# Patient Record
Sex: FEMALE | Race: WHITE | Employment: OTHER | URBAN - METROPOLITAN AREA
[De-identification: names, ages, dates, MRNs, and addresses within clinical notes are randomized per-mention and may not be internally consistent; named-entity substitution may affect disease eponyms.]

---

## 2019-05-28 ENCOUNTER — HOSPITAL ENCOUNTER (INPATIENT)
Age: 79
LOS: 2 days | Discharge: HOME | DRG: 378 | End: 2019-05-30
Attending: INTERNAL MEDICINE | Admitting: INTERNAL MEDICINE
Payer: MEDICARE

## 2019-05-28 PROBLEM — K92.2 GI BLEED: Status: ACTIVE | Noted: 2019-05-28

## 2019-05-28 LAB
ABO/RH: NORMAL
ANTIBODY SCREEN: NORMAL
HEMOGLOBIN: 8 G/DL (ref 12–16)

## 2019-05-28 PROCEDURE — 6370000000 HC RX 637 (ALT 250 FOR IP): Performed by: INTERNAL MEDICINE

## 2019-05-28 PROCEDURE — 2500000003 HC RX 250 WO HCPCS: Performed by: PHYSICIAN ASSISTANT

## 2019-05-28 PROCEDURE — 2580000003 HC RX 258: Performed by: PHYSICIAN ASSISTANT

## 2019-05-28 PROCEDURE — 86901 BLOOD TYPING SEROLOGIC RH(D): CPT

## 2019-05-28 PROCEDURE — 1200000000 HC SEMI PRIVATE

## 2019-05-28 PROCEDURE — 85018 HEMOGLOBIN: CPT

## 2019-05-28 PROCEDURE — 86850 RBC ANTIBODY SCREEN: CPT

## 2019-05-28 PROCEDURE — 86900 BLOOD TYPING SEROLOGIC ABO: CPT

## 2019-05-28 PROCEDURE — 36415 COLL VENOUS BLD VENIPUNCTURE: CPT

## 2019-05-28 RX ORDER — DIPHENHYDRAMINE HCL 25 MG
25 TABLET ORAL EVERY 4 HOURS PRN
COMMUNITY

## 2019-05-28 RX ORDER — M-VIT,TX,IRON,MINS/CALC/FOLIC 27MG-0.4MG
1 TABLET ORAL DAILY
COMMUNITY

## 2019-05-28 RX ORDER — CARVEDILOL 6.25 MG/1
6.25 TABLET ORAL 2 TIMES DAILY
COMMUNITY

## 2019-05-28 RX ORDER — ACETAMINOPHEN 325 MG/1
650 TABLET ORAL EVERY 4 HOURS PRN
Status: DISCONTINUED | OUTPATIENT
Start: 2019-05-28 | End: 2019-05-30 | Stop reason: HOSPADM

## 2019-05-28 RX ORDER — UBIDECARENONE 75 MG
200 CAPSULE ORAL DAILY
COMMUNITY

## 2019-05-28 RX ORDER — LEVOTHYROXINE SODIUM 0.03 MG/1
25 TABLET ORAL DAILY
Status: DISCONTINUED | OUTPATIENT
Start: 2019-05-29 | End: 2019-05-30 | Stop reason: HOSPADM

## 2019-05-28 RX ORDER — LIDOCAINE 40 MG/G
CREAM TOPICAL PRN
Status: DISCONTINUED | OUTPATIENT
Start: 2019-05-28 | End: 2019-05-30 | Stop reason: HOSPADM

## 2019-05-28 RX ORDER — SODIUM CHLORIDE 0.9 % (FLUSH) 0.9 %
10 SYRINGE (ML) INJECTION EVERY 12 HOURS SCHEDULED
Status: DISCONTINUED | OUTPATIENT
Start: 2019-05-28 | End: 2019-05-30 | Stop reason: HOSPADM

## 2019-05-28 RX ORDER — SODIUM CHLORIDE 0.9 % (FLUSH) 0.9 %
10 SYRINGE (ML) INJECTION PRN
Status: DISCONTINUED | OUTPATIENT
Start: 2019-05-28 | End: 2019-05-30 | Stop reason: HOSPADM

## 2019-05-28 RX ORDER — DOCUSATE SODIUM 100 MG/1
100 CAPSULE, LIQUID FILLED ORAL 2 TIMES DAILY PRN
COMMUNITY

## 2019-05-28 RX ORDER — ACETAMINOPHEN 325 MG/1
650 TABLET ORAL EVERY 4 HOURS PRN
COMMUNITY

## 2019-05-28 RX ORDER — SODIUM CHLORIDE 9 MG/ML
INJECTION, SOLUTION INTRAVENOUS CONTINUOUS
Status: DISCONTINUED | OUTPATIENT
Start: 2019-05-28 | End: 2019-05-30 | Stop reason: HOSPADM

## 2019-05-28 RX ORDER — IPRATROPIUM BROMIDE AND ALBUTEROL SULFATE 2.5; .5 MG/3ML; MG/3ML
1 SOLUTION RESPIRATORY (INHALATION) EVERY 4 HOURS PRN
COMMUNITY

## 2019-05-28 RX ORDER — IPRATROPIUM BROMIDE AND ALBUTEROL SULFATE 2.5; .5 MG/3ML; MG/3ML
1 SOLUTION RESPIRATORY (INHALATION) EVERY 4 HOURS PRN
Status: DISCONTINUED | OUTPATIENT
Start: 2019-05-28 | End: 2019-05-28

## 2019-05-28 RX ORDER — POTASSIUM CHLORIDE 750 MG/1
10 TABLET, EXTENDED RELEASE ORAL 2 TIMES DAILY
Status: DISCONTINUED | OUTPATIENT
Start: 2019-05-28 | End: 2019-05-30 | Stop reason: HOSPADM

## 2019-05-28 RX ORDER — LEVOTHYROXINE SODIUM 0.03 MG/1
25 TABLET ORAL DAILY
Status: DISCONTINUED | OUTPATIENT
Start: 2019-05-28 | End: 2019-05-28

## 2019-05-28 RX ORDER — SODIUM CHLORIDE 9 MG/ML
INJECTION, SOLUTION INTRAVENOUS CONTINUOUS
Status: DISCONTINUED | OUTPATIENT
Start: 2019-05-28 | End: 2019-05-29

## 2019-05-28 RX ORDER — IPRATROPIUM BROMIDE AND ALBUTEROL SULFATE 2.5; .5 MG/3ML; MG/3ML
1 SOLUTION RESPIRATORY (INHALATION) EVERY 4 HOURS PRN
Status: DISCONTINUED | OUTPATIENT
Start: 2019-05-28 | End: 2019-05-30 | Stop reason: HOSPADM

## 2019-05-28 RX ORDER — LEVOTHYROXINE SODIUM 0.03 MG/1
25 TABLET ORAL DAILY
COMMUNITY

## 2019-05-28 RX ORDER — POTASSIUM CHLORIDE 750 MG/1
10 CAPSULE, EXTENDED RELEASE ORAL DAILY
COMMUNITY

## 2019-05-28 RX ORDER — LIDOCAINE 50 MG/G
OINTMENT TOPICAL PRN
COMMUNITY

## 2019-05-28 RX ORDER — ONDANSETRON 2 MG/ML
4 INJECTION INTRAMUSCULAR; INTRAVENOUS EVERY 6 HOURS PRN
Status: DISCONTINUED | OUTPATIENT
Start: 2019-05-28 | End: 2019-05-30 | Stop reason: HOSPADM

## 2019-05-28 RX ORDER — NITROGLYCERIN 0.4 MG/1
0.4 TABLET SUBLINGUAL EVERY 5 MIN PRN
COMMUNITY

## 2019-05-28 RX ADMIN — POTASSIUM CHLORIDE 10 MEQ: 10 TABLET, EXTENDED RELEASE ORAL at 22:37

## 2019-05-28 RX ADMIN — SODIUM CHLORIDE: 9 INJECTION, SOLUTION INTRAVENOUS at 15:45

## 2019-05-28 RX ADMIN — Medication 10 ML: at 22:38

## 2019-05-28 RX ADMIN — SODIUM CHLORIDE 8 MG/HR: 9 INJECTION, SOLUTION INTRAVENOUS at 15:46

## 2019-05-28 ASSESSMENT — PAIN SCALES - GENERAL: PAINLEVEL_OUTOF10: 0

## 2019-05-28 NOTE — PROGRESS NOTES
4 Eyes Skin Assessment     The patient is being assess for   Admission    I agree that 2 RN's have performed a thorough Head to Toe Skin Assessment on the patient. ALL assessment sites listed below have been assessed. Areas assessed by both nurses:   [x]   Head, Face, and Ears   [x]   Shoulders, Back, and Chest, Abdomen  [x]   Arms, Elbows, and Hands   [x]   Coccyx, Sacrum, and Ischium  [x]   Legs, Feet, and Heels        Skin intact    **SHARE this note so that the co-signing nurse is able to place an eSignature**    Co-signer eSignature: {Esignature:[id number]}    Does the Patient have Skin Breakdown?   No          Gutierrez Prevention initiated:  No   Wound Care Orders initiated:  No      [facility] nurse consulted for Pressure Injury (Stage 3,4, Unstageable, DTI, NWPT, Complex wounds)and New or Established Ostomies:  No      Primary Nurse eSignature: Electronically signed by Nahun Miller RN on 5/28/19 at 4:09 PM

## 2019-05-28 NOTE — PROGRESS NOTES
Multiple changes made to home medication list. Dr. Roz Sandoval notified. Telephone order with read back to resume synthroid, lidocaine cream, potassium chloride, and duonebs as how she normally takes. To hold BP medications and lasix and BP borderline. MD also notified of hx of CHF. Fluids currently going at 150 mL/hr. Patient does c/o shortness of breath at rest, but respirations unlabored. Crackles noted to bases of lungs. To stop fluids for right now per Dr. Roz Sandoval.

## 2019-05-29 ENCOUNTER — ANESTHESIA EVENT (OUTPATIENT)
Dept: ENDOSCOPY | Age: 79
DRG: 378 | End: 2019-05-29
Payer: MEDICARE

## 2019-05-29 ENCOUNTER — ANESTHESIA (OUTPATIENT)
Dept: ENDOSCOPY | Age: 79
DRG: 378 | End: 2019-05-29
Payer: MEDICARE

## 2019-05-29 VITALS
DIASTOLIC BLOOD PRESSURE: 53 MMHG | RESPIRATION RATE: 22 BRPM | OXYGEN SATURATION: 100 % | SYSTOLIC BLOOD PRESSURE: 138 MMHG

## 2019-05-29 PROBLEM — F03.90 DEMENTIA (HCC): Status: ACTIVE | Noted: 2019-05-29

## 2019-05-29 PROBLEM — D62 ACUTE BLOOD LOSS ANEMIA: Status: ACTIVE | Noted: 2019-05-29

## 2019-05-29 PROBLEM — E03.9 ACQUIRED HYPOTHYROIDISM: Status: ACTIVE | Noted: 2019-05-29

## 2019-05-29 LAB
ANION GAP SERPL CALCULATED.3IONS-SCNC: 8 MMOL/L (ref 3–16)
BASOPHILS ABSOLUTE: 0.1 K/UL (ref 0–0.2)
BASOPHILS RELATIVE PERCENT: 0.6 %
BLOOD BANK DISPENSE STATUS: NORMAL
BLOOD BANK PRODUCT CODE: NORMAL
BPU ID: NORMAL
BUN BLDV-MCNC: 32 MG/DL (ref 7–20)
CALCIUM SERPL-MCNC: 8.7 MG/DL (ref 8.3–10.6)
CHLORIDE BLD-SCNC: 107 MMOL/L (ref 99–110)
CO2: 31 MMOL/L (ref 21–32)
CREAT SERPL-MCNC: 1.2 MG/DL (ref 0.6–1.2)
DESCRIPTION BLOOD BANK: NORMAL
EOSINOPHILS ABSOLUTE: 0.2 K/UL (ref 0–0.6)
EOSINOPHILS RELATIVE PERCENT: 2.1 %
GFR AFRICAN AMERICAN: 52
GFR NON-AFRICAN AMERICAN: 43
GLUCOSE BLD-MCNC: 120 MG/DL (ref 70–99)
HCT VFR BLD CALC: 22.4 % (ref 36–48)
HEMOGLOBIN: 6.8 G/DL (ref 12–16)
HEMOGLOBIN: 7.1 G/DL (ref 12–16)
HEMOGLOBIN: 7.3 G/DL (ref 12–16)
HEMOGLOBIN: 7.5 G/DL (ref 12–16)
LYMPHOCYTES ABSOLUTE: 1.6 K/UL (ref 1–5.1)
LYMPHOCYTES RELATIVE PERCENT: 18.6 %
MCH RBC QN AUTO: 31.2 PG (ref 26–34)
MCHC RBC AUTO-ENTMCNC: 32.5 G/DL (ref 31–36)
MCV RBC AUTO: 95.9 FL (ref 80–100)
MONOCYTES ABSOLUTE: 0.9 K/UL (ref 0–1.3)
MONOCYTES RELATIVE PERCENT: 10.6 %
NEUTROPHILS ABSOLUTE: 5.9 K/UL (ref 1.7–7.7)
NEUTROPHILS RELATIVE PERCENT: 68.1 %
PDW BLD-RTO: 14.8 % (ref 12.4–15.4)
PLATELET # BLD: 176 K/UL (ref 135–450)
PMV BLD AUTO: 8.2 FL (ref 5–10.5)
POTASSIUM REFLEX MAGNESIUM: 4.6 MMOL/L (ref 3.5–5.1)
RBC # BLD: 2.34 M/UL (ref 4–5.2)
SODIUM BLD-SCNC: 146 MMOL/L (ref 136–145)
WBC # BLD: 8.7 K/UL (ref 4–11)

## 2019-05-29 PROCEDURE — 88305 TISSUE EXAM BY PATHOLOGIST: CPT

## 2019-05-29 PROCEDURE — 80048 BASIC METABOLIC PNL TOTAL CA: CPT

## 2019-05-29 PROCEDURE — 7100000010 HC PHASE II RECOVERY - FIRST 15 MIN: Performed by: INTERNAL MEDICINE

## 2019-05-29 PROCEDURE — 6360000002 HC RX W HCPCS: Performed by: PHYSICIAN ASSISTANT

## 2019-05-29 PROCEDURE — P9016 RBC LEUKOCYTES REDUCED: HCPCS

## 2019-05-29 PROCEDURE — 3700000000 HC ANESTHESIA ATTENDED CARE: Performed by: INTERNAL MEDICINE

## 2019-05-29 PROCEDURE — 6360000002 HC RX W HCPCS: Performed by: NURSE ANESTHETIST, CERTIFIED REGISTERED

## 2019-05-29 PROCEDURE — 85025 COMPLETE CBC W/AUTO DIFF WBC: CPT

## 2019-05-29 PROCEDURE — 86923 COMPATIBILITY TEST ELECTRIC: CPT

## 2019-05-29 PROCEDURE — 7100000011 HC PHASE II RECOVERY - ADDTL 15 MIN: Performed by: INTERNAL MEDICINE

## 2019-05-29 PROCEDURE — 94150 VITAL CAPACITY TEST: CPT

## 2019-05-29 PROCEDURE — 85018 HEMOGLOBIN: CPT

## 2019-05-29 PROCEDURE — 6370000000 HC RX 637 (ALT 250 FOR IP): Performed by: INTERNAL MEDICINE

## 2019-05-29 PROCEDURE — 36430 TRANSFUSION BLD/BLD COMPNT: CPT

## 2019-05-29 PROCEDURE — 1200000000 HC SEMI PRIVATE

## 2019-05-29 PROCEDURE — 3609012400 HC EGD TRANSORAL BIOPSY SINGLE/MULTIPLE: Performed by: INTERNAL MEDICINE

## 2019-05-29 PROCEDURE — 0DB68ZX EXCISION OF STOMACH, VIA NATURAL OR ARTIFICIAL OPENING ENDOSCOPIC, DIAGNOSTIC: ICD-10-PCS | Performed by: INTERNAL MEDICINE

## 2019-05-29 PROCEDURE — 88342 IMHCHEM/IMCYTCHM 1ST ANTB: CPT

## 2019-05-29 PROCEDURE — 36415 COLL VENOUS BLD VENIPUNCTURE: CPT

## 2019-05-29 PROCEDURE — 94761 N-INVAS EAR/PLS OXIMETRY MLT: CPT

## 2019-05-29 PROCEDURE — 2500000003 HC RX 250 WO HCPCS: Performed by: PHYSICIAN ASSISTANT

## 2019-05-29 PROCEDURE — 2580000003 HC RX 258: Performed by: PHYSICIAN ASSISTANT

## 2019-05-29 PROCEDURE — 2580000003 HC RX 258: Performed by: INTERNAL MEDICINE

## 2019-05-29 PROCEDURE — 2700000000 HC OXYGEN THERAPY PER DAY

## 2019-05-29 PROCEDURE — 99222 1ST HOSP IP/OBS MODERATE 55: CPT | Performed by: PHYSICIAN ASSISTANT

## 2019-05-29 PROCEDURE — 2709999900 HC NON-CHARGEABLE SUPPLY: Performed by: INTERNAL MEDICINE

## 2019-05-29 RX ORDER — HYDROMORPHONE HCL 110MG/55ML
0.5 PATIENT CONTROLLED ANALGESIA SYRINGE INTRAVENOUS EVERY 5 MIN PRN
Status: DISCONTINUED | OUTPATIENT
Start: 2019-05-29 | End: 2019-05-30 | Stop reason: HOSPADM

## 2019-05-29 RX ORDER — PROMETHAZINE HYDROCHLORIDE 25 MG/ML
6.25 INJECTION, SOLUTION INTRAMUSCULAR; INTRAVENOUS
Status: DISCONTINUED | OUTPATIENT
Start: 2019-05-29 | End: 2019-05-30 | Stop reason: HOSPADM

## 2019-05-29 RX ORDER — OXYCODONE HYDROCHLORIDE AND ACETAMINOPHEN 5; 325 MG/1; MG/1
1 TABLET ORAL PRN
Status: ACTIVE | OUTPATIENT
Start: 2019-05-29 | End: 2019-05-29

## 2019-05-29 RX ORDER — PROPOFOL 10 MG/ML
INJECTION, EMULSION INTRAVENOUS PRN
Status: DISCONTINUED | OUTPATIENT
Start: 2019-05-29 | End: 2019-05-29 | Stop reason: SDUPTHER

## 2019-05-29 RX ORDER — HYDROMORPHONE HCL 110MG/55ML
0.25 PATIENT CONTROLLED ANALGESIA SYRINGE INTRAVENOUS EVERY 5 MIN PRN
Status: DISCONTINUED | OUTPATIENT
Start: 2019-05-29 | End: 2019-05-30 | Stop reason: HOSPADM

## 2019-05-29 RX ORDER — MORPHINE SULFATE 10 MG/ML
1 INJECTION, SOLUTION INTRAMUSCULAR; INTRAVENOUS EVERY 5 MIN PRN
Status: DISCONTINUED | OUTPATIENT
Start: 2019-05-29 | End: 2019-05-30 | Stop reason: HOSPADM

## 2019-05-29 RX ORDER — ONDANSETRON 2 MG/ML
4 INJECTION INTRAMUSCULAR; INTRAVENOUS PRN
Status: DISCONTINUED | OUTPATIENT
Start: 2019-05-29 | End: 2019-05-30 | Stop reason: HOSPADM

## 2019-05-29 RX ORDER — MORPHINE SULFATE 10 MG/ML
2 INJECTION, SOLUTION INTRAMUSCULAR; INTRAVENOUS EVERY 5 MIN PRN
Status: DISCONTINUED | OUTPATIENT
Start: 2019-05-29 | End: 2019-05-30 | Stop reason: HOSPADM

## 2019-05-29 RX ORDER — 0.9 % SODIUM CHLORIDE 0.9 %
250 INTRAVENOUS SOLUTION INTRAVENOUS ONCE
Status: COMPLETED | OUTPATIENT
Start: 2019-05-29 | End: 2019-05-30

## 2019-05-29 RX ORDER — DOCUSATE SODIUM 100 MG/1
100 CAPSULE, LIQUID FILLED ORAL 2 TIMES DAILY PRN
Status: DISCONTINUED | OUTPATIENT
Start: 2019-05-29 | End: 2019-05-30 | Stop reason: HOSPADM

## 2019-05-29 RX ORDER — DIPHENHYDRAMINE HYDROCHLORIDE 50 MG/ML
12.5 INJECTION INTRAMUSCULAR; INTRAVENOUS
Status: ACTIVE | OUTPATIENT
Start: 2019-05-29 | End: 2019-05-29

## 2019-05-29 RX ORDER — HYDRALAZINE HYDROCHLORIDE 20 MG/ML
5 INJECTION INTRAMUSCULAR; INTRAVENOUS EVERY 10 MIN PRN
Status: DISCONTINUED | OUTPATIENT
Start: 2019-05-29 | End: 2019-05-30 | Stop reason: HOSPADM

## 2019-05-29 RX ORDER — FUROSEMIDE 10 MG/ML
20 INJECTION INTRAMUSCULAR; INTRAVENOUS ONCE
Status: COMPLETED | OUTPATIENT
Start: 2019-05-29 | End: 2019-05-29

## 2019-05-29 RX ORDER — LABETALOL HYDROCHLORIDE 5 MG/ML
5 INJECTION, SOLUTION INTRAVENOUS EVERY 10 MIN PRN
Status: DISCONTINUED | OUTPATIENT
Start: 2019-05-29 | End: 2019-05-30 | Stop reason: HOSPADM

## 2019-05-29 RX ORDER — OXYCODONE HYDROCHLORIDE AND ACETAMINOPHEN 5; 325 MG/1; MG/1
2 TABLET ORAL PRN
Status: ACTIVE | OUTPATIENT
Start: 2019-05-29 | End: 2019-05-29

## 2019-05-29 RX ADMIN — SODIUM CHLORIDE 250 ML: 9 INJECTION, SOLUTION INTRAVENOUS at 18:12

## 2019-05-29 RX ADMIN — SODIUM CHLORIDE: 9 INJECTION, SOLUTION INTRAVENOUS at 17:58

## 2019-05-29 RX ADMIN — SODIUM CHLORIDE 8 MG/HR: 9 INJECTION, SOLUTION INTRAVENOUS at 09:19

## 2019-05-29 RX ADMIN — PROPOFOL 60 MG: 10 INJECTION, EMULSION INTRAVENOUS at 16:06

## 2019-05-29 RX ADMIN — POTASSIUM CHLORIDE 10 MEQ: 10 TABLET, EXTENDED RELEASE ORAL at 19:51

## 2019-05-29 RX ADMIN — FUROSEMIDE 20 MG: 10 INJECTION, SOLUTION INTRAVENOUS at 22:45

## 2019-05-29 ASSESSMENT — PAIN SCALES - GENERAL
PAINLEVEL_OUTOF10: 0

## 2019-05-29 ASSESSMENT — PAIN - FUNCTIONAL ASSESSMENT: PAIN_FUNCTIONAL_ASSESSMENT: 0-10

## 2019-05-29 NOTE — FLOWSHEET NOTE
05/29/19 0825   Vital Signs   Temp 97.9 °F (36.6 °C)   Temp Source Oral   Pulse 71   Resp 16   BP 96/62   BP Location Right Arm   BP Upper/Lower Lower   Level of Consciousness 0   MEWS Score 2   Patient Currently in Pain Denies   Pain Assessment   Pain Assessment 0-10   Pain Level 0   Oxygen Therapy   SpO2 99 %   O2 Device Nasal cannula   O2 Flow Rate (L/min) 2 L/min     Shift assessment completed, see flow sheet. Morning medications held as pt is NPO for EGD today. Pt alert, but pleasantly confused. Denies any pain, shortness of breath, or nausea. Respirations are easy, even, and unlabored. Bilateral lung sounds diminished with crackles in bases. Non-pitting edema to BLE. PIV WNL with Nexium infusing @ 10 mL/hr. No needs expressed at this time. Call light and bedside table within reach. Bed alarm on. Will continue to monitor.

## 2019-05-29 NOTE — PROGRESS NOTES
Shift assessment completed. Pt is alert with hx of dementia whom is pleasantly confused and disoriented X4. Vital signs are WNL. Checked and changed and repositioned. Urine only. No s/s of bleeding. Warm blanket provided. Denies pain. No complaints voiced. Pt denies needs at this time. SR up x 2 and bed in low position. Call light is within reach. Will monitor.

## 2019-05-29 NOTE — H&P
Gastroenterology Preop Assessment    Patient:   Quentin Madden   :    1940   Facility:   [facility]  Referring/PCP: Roseanna Reyna MD  Date:     2019    Subjective:   Procedure:egd    HPI/Reason for procedure:  Melena    Past Medical History:   Diagnosis Date    [facility]-fib [facility])     [facility] [address]     CAD (coronary artery disease)     Cerebral artery occlusion with cerebral infarction ([facility] [address] [facility]     CHF (congestive heart failure) [facility] [address] [facility]     COPD (chronic obstructive pulmonary disease) ([facility] [address] [facility]     Dementia     Hyperlipidemia     Hypertension     Pacemaker     UTI (urinary tract infection)      Past Surgical History:   Procedure Laterality Date    CHOLECYSTECTOMY      HYSTERECTOMY      LAP BAND      WRIST SURGERY Right 10/29/15    ORIF right wrist with allograft bone grafting       Social:   Social History     Tobacco Use    Smoking status: Never Smoker    Smokeless tobacco: Never Used   Substance Use Topics    Alcohol use: Not Currently     Alcohol/week: 0.0 oz     Family:   Family History   Problem Relation Age of Onset    Heart Attack Father     Stroke Brother        Scheduled Medications:    sodium chloride  250 mL Intravenous Once    furosemide  20 mg Intravenous Once    sodium chloride flush  10 mL Intravenous 2 times per day    pneumococcal 13-valent conjugate  0.5 mL Intramuscular Once    potassium chloride  10 mEq Oral BID    levothyroxine  25 mcg Oral Daily       Current Medications:    Prior to Admission medications    Medication Sig Start Date End Date Taking?  Authorizing Provider   aspirin 81 MG tablet Take 81 mg by mouth daily   Yes Historical Provider, MD   levothyroxine (SYNTHROID) 25 MCG tablet Take 25 mcg by mouth Daily   Yes Historical Provider, MD   Multiple Vitamins-Minerals (THERAPEUTIC MULTIVITAMIN-MINERALS) tablet Take 1 tablet by mouth daily   Yes Historical Provider, MD   potassium chloride (MICRO-K) 10 MEQ extended release capsule Take 10 mEq by mouth daily   Yes Historical Provider, MD   vitamin B-12 (CYANOCOBALAMIN) 100 MCG tablet Take 200 mcg by mouth daily   Yes Historical Provider, MD   carvedilol (COREG) 6.25 MG tablet Take 6.25 mg by mouth 2 times daily   Yes Historical Provider, MD   diphenhydrAMINE (BENADRYL ALLERGY) 25 MG tablet Take 25 mg by mouth every 4 hours as needed for Allergies   Yes Historical Provider, MD   docusate sodium (COLACE) 100 MG capsule Take 100 mg by mouth 2 times daily as needed for Constipation   Yes Historical Provider, MD   ipratropium-albuterol (DUONEB) 0.5-2.5 (3) MG/3ML SOLN nebulizer solution Inhale 1 vial into the lungs every 4 hours as needed for Shortness of Breath   Yes Historical Provider, MD   lidocaine (XYLOCAINE) 5 % ointment Apply topically as needed for Pain Apply topically as needed. To lower back   Yes Historical Provider, MD   nitroGLYCERIN (NITROSTAT) 0.4 MG SL tablet Place 0.4 mg under the tongue every 5 minutes as needed for Chest pain up to max of 3 total doses. If no relief after 1 dose, call 911.    Yes Historical Provider, MD   acetaminophen (TYLENOL) 325 MG tablet Take 650 mg by mouth every 4 hours as needed for Pain or Fever   Yes Historical Provider, MD   raNITIdine HCl (ZANTAC PO) Take by mouth   Yes Historical Provider, MD   furosemide (LASIX) 40 MG tablet 40 mg 2 times daily  8/25/15  Yes Historical Provider, MD   isosorbide dinitrate (ISORDIL) 10 MG tablet 10 mg 2 times daily  9/16/15  Yes Historical Provider, MD   pantoprazole (PROTONIX) 40 MG tablet 40 mg daily  9/16/15  Yes Historical Provider, MD   simvastatin (ZOCOR) 40 MG tablet 40 mg nightly  9/16/15  Yes Historical Provider, MD         Current Facility-Administered Medications:     docusate sodium (COLACE) capsule 100 mg, 100 mg, Oral, BID PRN, Demario Daugherty PA-C    0.9 % sodium chloride bolus, 250 mL, Intravenous, Once, [facility]    furosemide (LASIX) injection 20 mg, 20 mg, Intravenous, Once, Demario Hawley

## 2019-05-29 NOTE — PROGRESS NOTES
Patient is laying with eyes closed. No s/s of distress at this time. Skin is cool warm blanket added. Call light and bedside table within reach. Will continue to monitor.

## 2019-05-29 NOTE — ANESTHESIA PRE PROCEDURE
Department of Anesthesiology  Preprocedure Note       Name:  Nick Simpson   Age:  [de-identified] y.o.  :  1940                                          MRN:  [id number]         Date:  2019      Surgeon: Eneida Dewey):  Venkatesh Holland DO    Procedure: EGD W/ANES. (N/A )    Medications prior to admission:   Prior to Admission medications    Medication Sig Start Date End Date Taking? Authorizing Provider   aspirin 81 MG tablet Take 81 mg by mouth daily   Yes Historical Provider, MD   levothyroxine (SYNTHROID) 25 MCG tablet Take 25 mcg by mouth Daily   Yes Historical Provider, MD   Multiple Vitamins-Minerals (THERAPEUTIC MULTIVITAMIN-MINERALS) tablet Take 1 tablet by mouth daily   Yes Historical Provider, MD   potassium chloride (MICRO-K) 10 MEQ extended release capsule Take 10 mEq by mouth daily   Yes Historical Provider, MD   vitamin B-12 (CYANOCOBALAMIN) 100 MCG tablet Take 200 mcg by mouth daily   Yes Historical Provider, MD   carvedilol (COREG) 6.25 MG tablet Take 6.25 mg by mouth 2 times daily   Yes Historical Provider, MD   diphenhydrAMINE (BENADRYL ALLERGY) 25 MG tablet Take 25 mg by mouth every 4 hours as needed for Allergies   Yes Historical Provider, MD   docusate sodium (COLACE) 100 MG capsule Take 100 mg by mouth 2 times daily as needed for Constipation   Yes Historical Provider, MD   ipratropium-albuterol (DUONEB) 0.5-2.5 (3) MG/3ML SOLN nebulizer solution Inhale 1 vial into the lungs every 4 hours as needed for Shortness of Breath   Yes Historical Provider, MD   lidocaine (XYLOCAINE) 5 % ointment Apply topically as needed for Pain Apply topically as needed. To lower back   Yes Historical Provider, MD   nitroGLYCERIN (NITROSTAT) 0.4 MG SL tablet Place 0.4 mg under the tongue every 5 minutes as needed for Chest pain up to max of 3 total doses. If no relief after 1 dose, call 911.    Yes Historical Provider, MD   acetaminophen (TYLENOL) 325 MG tablet Take 650 mg by mouth every 4 hours as needed for Pain or Fever   Yes Historical Provider, MD   raNITIdine HCl (ZANTAC PO) Take by mouth   Yes Historical Provider, MD   furosemide (LASIX) 40 MG tablet 40 mg 2 times daily  8/25/15  Yes Historical Provider, MD   isosorbide dinitrate (ISORDIL) 10 MG tablet 10 mg 2 times daily  9/16/15  Yes Historical Provider, MD   pantoprazole (PROTONIX) 40 MG tablet 40 mg daily  9/16/15  Yes Historical Provider, MD   simvastatin (ZOCOR) 40 MG tablet 40 mg nightly  9/16/15  Yes Historical Provider, MD       Current medications:    Current Facility-Administered Medications   Medication Dose Route Frequency Provider Last Rate Last Dose    sodium chloride flush 0.9 % injection 10 mL  10 mL Intravenous 2 times per day DIANNA Ruvalcaba   10 mL at 05/28/19 2238    sodium chloride flush 0.9 % injection 10 mL  10 mL Intravenous PRN DIANNA Ruvalcaba        acetaminophen (TYLENOL) tablet 650 mg  650 mg Oral Q4H PRN DIANNA Ruvalcaba        [facility]) injection 4 mg  4 mg Intravenous Q6H PRN DIANNA Ruvalcaba        esomeprazole (NEXIUM) 80 mg in sodium chloride 0.9 % 100 mL infusion  8 mg/hr Intravenous Continuous DIANNA Ruvalcaba 10 mL/hr at 05/28/19 1546 8 mg/hr at 05/28/19 1546    0.9 % sodium chloride infusion   Intravenous Continuous DIANNA Ruvalcaba   Stopped at 05/28/19 1802    pneumococcal 13-valent conjugate (PREVNAR) injection 0.5 mL  0.5 mL Intramuscular Once Balbina Feng MD        0.9 % sodium chloride infusion   Intravenous Continuous [facility] Gregorio Parker DO        potassium chloride (KLOR-CON M) extended release tablet 10 mEq  10 mEq Oral BID Balbina Feng MD   10 mEq at 05/28/19 2237    lidocaine (LMX) 4 % cream   Topical PRN Balbina Feng MD        levothyroxine (SYNTHROID) tablet 25 mcg  25 mcg Oral Daily Balbina Feng MD   Stopped at 05/29/19 0700    ipratropium-albuterol (DUONEB) nebulizer solution 1 ampule  1 ampule Inhalation Q4H PRN Balbina Feng MD Allergies: Allergies   Allergen Reactions    Demerol Hcl [Meperidine] Anxiety    Penicillins Nausea And Vomiting    Sulfur Nausea And Vomiting    Vicodin [Hydrocodone-Acetaminophen] Nausea And Vomiting       Problem List:    Patient Active Problem List   Diagnosis Code    Closed fracture of radius S52.90XA    GI bleed K92.2       Past Medical History:        Diagnosis Date    [facility] [facility]     Brain bleed [facility] [facility]     Cerebral artery occlusion with cerebral infarction ([facility] [address] [facility]     CHF (congestive heart failure) (HCC)     COPD (chronic obstructive pulmonary disease) ([facility] [address] [facility]     Hyperlipidemia     Hypertension     Pacemaker        Past Surgical History:        Procedure Laterality Date    CHOLECYSTECTOMY      HYSTERECTOMY      LAP BAND      WRIST SURGERY Right 10/29/15    ORIF right wrist with allograft bone grafting       Social History:    Social History     Tobacco Use    Smoking status: Never Smoker    Smokeless tobacco: Never Used   Substance Use Topics    Alcohol use: Not Currently     Alcohol/week: 0.0 oz                                Counseling given: Not Answered      Vital Signs (Current):   Vitals:    05/28/19 1542 05/28/19 1923 05/28/19 2030 05/29/19 0241   BP: 99/62 97/65  99/63   Pulse:  75  70   Resp:  18  18   Temp:  98 °F (36.7 °C)  97 °F (36.1 °C)   TempSrc:  Oral  Axillary   SpO2:  100%  100%   Weight:   193 lb (87.5 kg)    Height:   5' 8\" (1.727 m)                                               BP Readings from Last 3 Encounters:   05/29/19 99/63   12/22/15 114/63   11/10/15 108/61       NPO Status:                                                                                 BMI:   Wt Readings from Last 3 Encounters:   05/28/19 193 lb (87.5 kg)   12/22/15 212 lb 15.4 oz (96.6 kg)   11/10/15 212 lb 15.4 oz (96.6 kg)     Body mass index is 29.35 kg/m².     CBC:   Lab Results   Component Value Date    WBC 8.7 05/29/2019    RBC 2.34 05/29/2019    HGB 7.3 05/29/2019    HCT 22.4 05/29/2019    MCV 95.9 05/29/2019    RDW 14.8 05/29/2019     05/29/2019       CMP:   Lab Results   Component Value Date     05/29/2019    K 4.6 05/29/2019     05/29/2019    CO2 31 05/29/2019    BUN 32 05/29/2019    CREATININE 1.2 05/29/2019    GFRAA 52 05/29/2019    LABGLOM 43 05/29/2019    GLUCOSE 120 05/29/2019    CALCIUM 8.7 05/29/2019       POC Tests: No results for input(s): POCGLU, POCNA, POCK, POCCL, POCBUN, POCHEMO, POCHCT in the last 72 hours. Coags: No results found for: PROTIME, INR, APTT    HCG (If Applicable): No results found for: PREGTESTUR, PREGSERUM, HCG, HCGQUANT     ABGs: No results found for: PHART, PO2ART, GOS8ORD, TIE8IKB, BEART, T6ZSZXXX     Type & Screen (If Applicable):  No results found for: LABABO, [address]    Anesthesia Evaluation  Patient summary reviewed and Nursing notes reviewed no history of anesthetic complications:   Airway: Mallampati: III     Neck ROM: full   Dental:          Pulmonary:Negative Pulmonary ROS and normal exam    (+) COPD:                             Cardiovascular:Negative CV ROS    (+) hypertension:, pacemaker:, CAD:, dysrhythmias: atrial fibrillation, CHF:, hyperlipidemia    (-)  angina                Neuro/Psych:   Negative Neuro/Psych ROS  (+) CVA:,             GI/Hepatic/Renal: Neg GI/Hepatic/Renal ROS       (-) hiatal hernia and GERD       Endo/Other: Negative Endo/Other ROS                    Abdominal:           Vascular:                                      Anesthesia Plan      general     ASA 3     (I discussed with the patient the risks and benefits of PIV, general anesthesia, IV Narcotics, PACU. All questions were answered the patient agrees with the plan.)  Induction: intravenous. Pre-Operative Diagnosis: GI bleed [K92.2]    [de-identified] y.o.   BMI:  Body mass index is 29.35 kg/m².      Vitals:    05/29/19 0241 05/29/19 0825 05/29/19 1313 05/29/19 1455   BP: 99/63 96/62 (!) 99/58 (!) 124/49   Pulse: 70 71 71 70   Resp: 18 16 16 16   Temp: 97 °F (36.1 °C) 97.9 °F (36.6 °C) 97.1 °F (36.2 °C) 97 °F (36.1 °C)   TempSrc: Axillary Oral Oral Temporal   SpO2: 100% 99% 96% 96%   Weight:       Height:           Allergies   Allergen Reactions    Demerol Hcl [Meperidine] Anxiety    Zyvox [Linezolid]     Penicillins Nausea And Vomiting    Sulfur Nausea And Vomiting    Vicodin [Hydrocodone-Acetaminophen] Nausea And Vomiting       Social History     Tobacco Use    Smoking status: Never Smoker    Smokeless tobacco: Never Used   Substance Use Topics    Alcohol use: Not Currently     Alcohol/week: 0.0 oz       LABS:    CBC  Lab Results   Component Value Date/Time    WBC 8.7 05/29/2019 05:25 AM    HGB 7.1 (L) 05/29/2019 12:16 PM    HCT 22.4 (L) 05/29/2019 05:25 AM     05/29/2019 05:25 AM     RENAL  Lab Results   Component Value Date/Time     (H) 05/29/2019 05:25 AM    K 4.6 05/29/2019 05:25 AM     05/29/2019 05:25 AM    CO2 31 05/29/2019 05:25 AM    BUN 32 (H) 05/29/2019 05:25 AM    CREATININE 1.2 05/29/2019 05:25 AM    GLUCOSE 120 (H) 05/29/2019 05:25 AM     COAGS  No results found for: PROTIME, INR, APTT    Birdie Casillas MD   5/29/2019

## 2019-05-29 NOTE — PLAN OF CARE
Problem: Falls - Risk of:  Goal: Will remain free from falls  Description  Will remain free from falls  5/29/2019 0542 by Yogi Nguyen RN  Outcome: Ongoing  5/28/2019 2039 by Luther Fernandez RN  Outcome: Met This Shift  Note:   Pt high fall risk. Fall prevention measures in place. Bed alarm on. Call light and bedside table within reach. Bed wheels locked and bed in low position. Goal: Absence of physical injury  Description  Absence of physical injury  5/29/2019 0542 by Yogi Nguyen RN  Outcome: Ongoing  5/28/2019 2039 by Luther Fernandez RN  Outcome: Met This Shift     Problem: Infection:  Goal: Will remain free from infection  Description  Will remain free from infection  Outcome: Ongoing     Problem: Safety:  Goal: Free from accidental physical injury  Description  Free from accidental physical injury  Outcome: Ongoing  Goal: Free from intentional harm  Description  Free from intentional harm  Outcome: Ongoing     Problem: Daily Care:  Goal: Daily care needs are met  Description  Daily care needs are met  Outcome: Ongoing     Problem: Pain:  Goal: Patient's pain/discomfort is manageable  Description  Patient's pain/discomfort is manageable  Outcome: Ongoing     Problem: Skin Integrity:  Goal: Skin integrity will stabilize  Description  Skin integrity will stabilize  5/29/2019 0542 by Yogi Nguyen RN  Outcome: Ongoing  5/28/2019 2039 by Luther Fernandez RN  Outcome: Met This Shift  Note:   No s/s of skin breakdown noted. Pt turns self and encouraged to do so every 2 hours and prn. Problem: Discharge Planning:  Goal: Patients continuum of care needs are met  Description  Patients continuum of care needs are met  Outcome: Ongoing     Problem: Discharge Planning:  Goal: Discharged to appropriate level of care  Description  Discharged to appropriate level of care  Outcome: Ongoing     Problem:  Bowel Function - Altered:  Goal: Bowel elimination is within specified parameters  Description  Bowel elimination is within specified parameters  Outcome: Ongoing     Problem: Fluid Volume - Imbalance:  Goal: Will show no signs and symptoms of excessive bleeding  Description  Will show no signs and symptoms of excessive bleeding  Outcome: Ongoing  Goal: Absence of imbalanced fluid volume signs and symptoms  Description  Absence of imbalanced fluid volume signs and symptoms  Outcome: Ongoing     Problem: Nausea/Vomiting:  Goal: Absence of nausea/vomiting  Description  Absence of nausea/vomiting  Outcome: Ongoing  Goal: Able to drink  Description  Able to drink  Outcome: Ongoing  Goal: Able to eat  Description  Able to eat  Outcome: Ongoing  Goal: Ability to achieve adequate nutritional intake will improve  Description  Ability to achieve adequate nutritional intake will improve  Outcome: Ongoing

## 2019-05-29 NOTE — FLOWSHEET NOTE
05/28/19 1509   Vital Signs   Temp 97.1 °F (36.2 °C)   Temp Source Oral   Pulse 89   Resp 18   BP 99/62   Level of Consciousness 0   MEWS Score 2   Patient Currently in Pain Denies   Pain Assessment   Pain Assessment 0-10   Pain Level 0   Oxygen Therapy   SpO2 100 %   O2 Device Nasal cannula   O2 Flow Rate (L/min) 2 L/min     Pt arrived to  207-2 in stable condition. NS and protonix gtt infusing upon arrival. Patient alert, but pleasantly confused. VSS. Respirations are easy, even, and unlabored. Pt does states she feels short of breath. Bilateral lung sounds diminished with crackles in bases. Generalized trace edema. Abdomen soft, but tender. PIV WNL. Pt + family oriented to room +surroundings and updated on plan of care. Call light and bedside table within reach. Bed alarm on. Will continue to monitor.

## 2019-05-29 NOTE — DISCHARGE INSTR - COC
documented pain score (0-10 scale): Pain Level: 0  Last Weight:   Wt Readings from Last 1 Encounters:   05/28/19 193 lb (87.5 kg)     Mental Status:  disoriented and alert    IV Access:  - None    Nursing Mobility/ADLs:  Walking   Dependent  Transfer  Dependent  Bathing  Dependent  Dressing  Dependent  Toileting  Dependent  Feeding  Assisted  Med Admin  Assisted  Med Delivery   whole    Wound Care Documentation and Therapy:        Elimination:  Continence:   · Bowel: No  · Bladder: No  Urinary Catheter: None   Colostomy/Ileostomy/Ileal Conduit: No       Date of Last BM: 5-30-19  No intake or output data in the 24 hours ending 05/29/19 1026  No intake/output data recorded. Safety Concerns: At Risk for Falls    Impairments/Disabilities:      None    Nutrition Therapy:  Current Nutrition Therapy:   - Oral Diet:  General    Routes of Feeding: Oral  Liquids: Thin Liquids  Daily Fluid Restriction: no  Last Modified Barium Swallow with Video (Video Swallowing Test): not done    Treatments at the Time of Hospital Discharge:   Respiratory Treatments:   Oxygen Therapy:  is not on home oxygen therapy. Ventilator:    - No ventilator support    Rehab Therapies: PT/OT/skilled nursing  Weight Bearing Status/Restrictions: No weight bearing restirctions  Other Medical Equipment (for information only, NOT a DME order):     Other Treatments:     Patient's personal belongings (please select all that are sent with patient):  None    RN SIGNATURE:  Electronically signed by Eliecer Perales RN on 5/30/19 at 2:38 PM    CASE MANAGEMENT/SOCIAL WORK SECTION    Inpatient Status Date: 5/28/2019    Readmission Risk Assessment Score:  Readmission Risk              Risk of Unplanned Readmission:        8           Discharging to Facility/ Agency   Name: [facility]  Address: [address] [place] [address], [facility]  Phone: [phone]  Fax: [phone]  ·     Dialysis Facility (if applicable)   · Name:  · Address:  · Dialysis Schedule:  · Phone:  · Fax:    / signature: Electronically signed by Melania Orosco RN on 5/30/19 at 2:39 PM    PHYSICIAN SECTION    Prognosis: Good    Condition at Discharge: Stable    Rehab Potential (if transferring to Rehab): Good    Recommended Labs or Other Treatments After Discharge:     Physician Certification: I certify the above information and transfer of Leeanna Pradhan  is necessary for the continuing treatment of the diagnosis listed and that she requires formerly [facility] for greater 30 days.      Update Admission H&P: No change in H&P    PHYSICIAN SIGNATURE:  Electronically signed by Umair Macias MD on 5/30/19 at 1:36 PM

## 2019-05-29 NOTE — CONSULTS
Gastroenterology Consult Note    Patient:   Alonzo Lemus   :    1940   Facility:   [facility]  Referring/PCP: Laddie Schilder, MD  Date:     2019  Consultant:   Derek Mireles DO    Subjective: This [de-identified] y.o. female was admitted 2019 with a diagnosis of \"GI bleed [K92.2]\" and is seen in consultation regarding melena    [de-identified] yo female with history of atrial fibrillation, CHF, COPD, subdural hematoma presented to [place] [address] with melena. Discussion with the family prompted discussion that they would want endoscopic intervention. Patient transferred as no GI services available at [place] [address] for several days.     Past Medical History:   Diagnosis Date    A-fib [facility])     [facility]     Cerebral artery occlusion with cerebral infarction ([facility]     CHF (congestive heart failure) (HCC)     COPD (chronic obstructive pulmonary disease) (HCC)     Hyperlipidemia     Hypertension     Pacemaker      Past Surgical History:   Procedure Laterality Date    CHOLECYSTECTOMY      HYSTERECTOMY      LAP BAND      WRIST SURGERY Right 10/29/15    ORIF right wrist with allograft bone grafting       Social:   Social History     Tobacco Use    Smoking status: Never Smoker    Smokeless tobacco: Never Used   Substance Use Topics    Alcohol use: Not Currently     Alcohol/week: 0.0 oz     Family:   Family History   Problem Relation Age of Onset    Heart Attack Father     Stroke Brother        Scheduled Medications:    sodium chloride flush  10 mL Intravenous 2 times per day    [START ON 2019] pneumococcal 13-valent conjugate  0.5 mL Intramuscular Once    potassium chloride  10 mEq Oral BID    [START ON 2019] levothyroxine  25 mcg Oral Daily     Infusions:    pantoprozole (PROTONIX) infusion 8 mg/hr (19 1546)    sodium chloride Stopped (19 180)    sodium chloride       PRN Medications: sodium chloride flush, acetaminophen, ondansetron, lidocaine, ipratropium-albuterol  Allergies: Allergies   Allergen Reactions    Demerol Hcl [Meperidine] Anxiety    Penicillins Nausea And Vomiting    Sulfur Nausea And Vomiting    Vicodin [Hydrocodone-Acetaminophen] Nausea And Vomiting       ROS: Constitutional: negative for chills, fevers and sweats  Eyes: negative for cataracts, icterus and redness  Ears, nose, mouth, throat, and face: negative for epistaxis, hearing loss and sore throat  Respiratory: negative for cough, hemoptysis and sputum  Cardiovascular: negative for chest pain, dyspnea and lower extremity edema  Gastrointestinal: as per HPI  Genitourinary:negative for dysuria, frequency and hematuria  Neurological: negative for coordination problems, dizziness and gait problems  Behavioral/Psych: negative for anxiety, depression and mood swings    Objective:     Physical Exam:   BP 97/65   Pulse 75   Temp 98 °F (36.7 °C) (Oral)   Resp 18   Ht 5' 8\" (1.727 m)   Wt 193 lb (87.5 kg)   SpO2 100%   BMI 29.35 kg/m²     BP 97/65   Pulse 75   Temp 98 °F (36.7 °C) (Oral)   Resp 18   Ht 5' 8\" (1.727 m)   Wt 193 lb (87.5 kg)   SpO2 100%   BMI 29.35 kg/m²   General appearance: alert, appears stated age and cooperative  Lungs: clear to auscultation bilaterally  Chest wall: no tenderness  Abdomen: soft, non-tender; bowel sounds normal; no masses,  no organomegaly  Extremities: extremities normal, atraumatic, no cyanosis or edema    Lab and Imaging Review   Labs:  CBC:   Recent Labs     05/28/19  1758   HGB 8.0*     BMP: No results for input(s): NA, K, CL, CO2, PHOS, BUN, CREATININE in the last 72 hours. Invalid input(s): CA  LIVER PROFILE: No results for input(s): AST, ALT, LIPASE, PROT, BILIDIR, BILITOT, ALKPHOS in the last 72 hours. Invalid input(s): AMYLASE,  ALB  PT/INR: No results for input(s): INR in the last 72 hours. Invalid input(s): PT    Assessment:     [de-identified] yo female with multiple comorbidities and DNR-CCA status.   Family wants upper endoscopy given acute blood loss anemia and melena    Plan:   1.   Will plan upper endoscopy for further evaluation and treatment

## 2019-05-29 NOTE — CARE COORDINATION
Case Management Assessment  Initial Evaluation    Date/Time of Evaluation: 5/29/2019 10:14 AM  Assessment Completed by: Radha Agee    Patient Name: Leeanna Pradhan  YOB: 1940  Diagnosis: GI bleed [K92.2]  Date / Time: 5/28/2019  3:06 PM  Admission status/Date: Inpatient 5/28/2019  Chart Reviewed: Yes      Patient Interviewed: Yes   Family Interviewed:  No      Hospitalization in the last 30 days:  No    Contacts  :   Chris Aldridge  Relationship to Patient: son  Phone Number:  [phone]  Alternate Contact:     Relationship to Patient:     Phone Number:       Met with: patient    Current PCP Bashir Ta MD    [id number] required for SNF : Yes      3 night stay required -No    ADLS  Support Systems: Family Members, Children  Transportation: EMS transportation    Meal Preparation: per facility     Housing  Home Environment: LTC at [facility]  Steps:   Plans to Return to Present Housing: Yes  Other Identified Issues:     Pool [place]  Currently active with [address] : No  Type of Home Care Services: None  Passport/Waiver : No  :                      Phone Number:    Passport/Waiver Services: NA          Durable Medical Equipment   DME Provider: per facility  Equipment: Walker__Cane__RTS__ BSC__Shower Chair__  02__ HHN__ CPAP__  BiPap__  Hospital Bed__ W/C___ Other__________      Has Home O2 in place on admit:  No  Informed of need to bring portable home O2 tank on day of discharge for nursing to connect prior to leaving:   No  Verbalized agreement/Understanding:   No    Community Service Affiliation  Dialysis:  No    · Name:  · Location  · Dialysis Schedule:  · Phone:   · Fax: Outpatient PT/OT: No    Cancer Center: No     CHF Clinic: No     Pulmonary Rehab: No  Pain Clinic: No  Community Mental Health: No    Wound Clinic: No     Other: NA    DISCHARGE PLAN: Chart reviewed.  Met with pt and spoke w/ son, [facility]) via phone and explained the role of the CM. Plan: Return to LTC at [facility]. +bed hold per Colt Pavon. +eCOC, +CM following. Explained Case Management role/services.

## 2019-05-29 NOTE — H&P
Hospital Medicine History & Physical      PCP: Roseanna Reyna MD    Date of Admission: 5/28/2019    Date of Service: Pt seen/examined on 5/29/2019     Chief Complaint:  Blood in bowel movement    History Of Present Illness: The patient is a [de-identified] y.o. female with dementia who lives in a LTC who presented to [facility] ED with complaint of \"blood in bowel movement. \"  She has no relatives at bedside. My history is obtained from ER notes which are limited. Patient cannot provide any history. Hemoccult was + in ER. Hb was in the 10 range. She was transferred to [facility] for admission and seen by GI. Patient's family wants her to undergo GI procedures to eval for bleeding. This morning her Hb is down to 7.3. She repeatedly asks \"why am I here, where am I? \" during exam.      Past Medical History:        Diagnosis Date    A-fib ([address]     Brain bleed ([address]     CAD (coronary artery disease)     Cerebral artery occlusion with cerebral infarction ([address]     CHF (congestive heart failure) (HCC)     COPD (chronic obstructive pulmonary disease) (HCC)     Dementia     Hyperlipidemia     Hypertension     Pacemaker     UTI (urinary tract infection)        Past Surgical History:        Procedure Laterality Date    CHOLECYSTECTOMY      HYSTERECTOMY      LAP BAND      WRIST SURGERY Right 10/29/15    ORIF right wrist with allograft bone grafting       Medications Prior to Admission:    Prior to Admission medications    Medication Sig Start Date End Date Taking?  Authorizing Provider   aspirin 81 MG tablet Take 81 mg by mouth daily   Yes Historical Provider, MD   levothyroxine (SYNTHROID) 25 MCG tablet Take 25 mcg by mouth Daily   Yes Historical Provider, MD   Multiple Vitamins-Minerals (THERAPEUTIC MULTIVITAMIN-MINERALS) tablet Take 1 tablet by mouth daily   Yes Historical Provider, MD   potassium chloride (MICRO-K) 10 MEQ extended release capsule Take 10 mEq by mouth daily   Yes Historical Provider, MD   vitamin B-12 (CYANOCOBALAMIN) 100 MCG tablet Take 200 mcg by mouth daily   Yes Historical Provider, MD   carvedilol (COREG) 6.25 MG tablet Take 6.25 mg by mouth 2 times daily   Yes Historical Provider, MD   diphenhydrAMINE (BENADRYL ALLERGY) 25 MG tablet Take 25 mg by mouth every 4 hours as needed for Allergies   Yes Historical Provider, MD   docusate sodium (COLACE) 100 MG capsule Take 100 mg by mouth 2 times daily as needed for Constipation   Yes Historical Provider, MD   ipratropium-albuterol (DUONEB) 0.5-2.5 (3) MG/3ML SOLN nebulizer solution Inhale 1 vial into the lungs every 4 hours as needed for Shortness of Breath   Yes Historical Provider, MD   lidocaine (XYLOCAINE) 5 % ointment Apply topically as needed for Pain Apply topically as needed. To lower back   Yes Historical Provider, MD   nitroGLYCERIN (NITROSTAT) 0.4 MG SL tablet Place 0.4 mg under the tongue every 5 minutes as needed for Chest pain up to max of 3 total doses. If no relief after 1 dose, call 911. Yes Historical Provider, MD   acetaminophen (TYLENOL) 325 MG tablet Take 650 mg by mouth every 4 hours as needed for Pain or Fever   Yes Historical Provider, MD   raNITIdine HCl (ZANTAC PO) Take by mouth   Yes Historical Provider, MD   furosemide (LASIX) 40 MG tablet 40 mg 2 times daily  8/25/15  Yes Historical Provider, MD   isosorbide dinitrate (ISORDIL) 10 MG tablet 10 mg 2 times daily  9/16/15  Yes Historical Provider, MD   pantoprazole (PROTONIX) 40 MG tablet 40 mg daily  9/16/15  Yes Historical Provider, MD   simvastatin (ZOCOR) 40 MG tablet 40 mg nightly  9/16/15  Yes Historical Provider, MD       Allergies:  Demerol hcl [meperidine]; Zyvox [linezolid]; Penicillins; Sulfur; and Vicodin [hydrocodone-acetaminophen]    Social History:  The patient currently lives at [address] [place] [address]:   reports that she has never smoked. She has never used smokeless tobacco.  ETOH:   reports that she drank alcohol.       Family History:   Positive as follows:        Problem Relation Age of Onset    Heart Attack Father     Stroke Brother        REVIEW OF SYSTEMS:     Could not obtain 2/2 dementia     PHYSICAL EXAM:    BP 96/62   Pulse 71   Temp 97.9 °F (36.6 °C) (Oral)   Resp 16   Ht 5' 8\" (1.727 m)   Wt 193 lb (87.5 kg)   SpO2 99%   BMI 29.35 kg/m²     Gen: Elderly female who is pleasantly confused. No distress. Alert. Eyes: PERRL. No sclera icterus. No conjunctival injection. ENT: No discharge. Pharynx clear. Neck: No JVD. No Carotid Bruit. Trachea midline. Resp: No accessory muscle use. + mild crackles. No wheezes. No rhonchi. CV: Regular rate. Regular rhythm. No murmur. No rub. No edema. GI: Non-tender. Non-distended. Old healed surgical scars. Soft tissue abnormality, ?scar tissue- palpated in RUQ  + BS   No hernia   Skin: Warm and dry. No nodule on exposed extremities. No rash on exposed extremities. M/S: No cyanosis. No joint deformity. No clubbing. Neuro: Awake. Grossly nonfocal    Psych: Oriented to self only. No anxiety or agitation. CBC:   Recent Labs     05/28/19  1758 05/29/19  0030 05/29/19  0525   WBC  --   --  8.7   HGB 8.0* 7.5* 7.3*   HCT  --   --  22.4*   MCV  --   --  95.9   PLT  --   --  176     BMP:   Recent Labs     05/29/19  0525   *   K 4.6      CO2 31   BUN 32*   CREATININE 1.2     CULTURES  None     RADIOLOGY  CT abd pelvis from [facility] 5/28/19  No acute abnormality of the abdomen of pelvis  Diverticulosis, no diverticulitis  Status post gastric banding.   No evidence of complication     ASSESSMENT/PLAN:    GI Bleed  Acute blood loss anemia   - Hb 10 at [facility]-> 7.3 today  - GI c/s  - EGD today  - IV PPI  - monitor h/h and plan to transfuse for hb<10   - stopped IVF due to h/o CHF    S/p gastric banding    Nonischemic CMP  Chronic systolic CHF  - EF 23% in 2015  - home BB and ACEi on hold with low BP  - lasix on hold  - monitor fluid status, she is off IV NS now     PAF  - clinically in SR  - ASA on hold     CKD3  - Crt stable    Hypothyroidism  - home dose of synthroid 25 mcg     Dementia  - supportive care     DVT Prophylaxis: *SCD  Diet: Diet NPO Time Specified Exceptions are: Ice Chips  Code Status: DNR-CCA    William Grover PA-C  5/29/2019 10:37 AM

## 2019-05-29 NOTE — ANESTHESIA POSTPROCEDURE EVALUATION
Department of Anesthesiology  Postprocedure Note    Patient: Cassidy Man  MRN: [id number]  YOB: 1940  Date of evaluation: 5/29/2019  Time:  5:33 PM     Procedure Summary     Date:  05/29/19 [facility] / [facility]    Anesthesia Start:  6617 Anesthesia Stop:  3115    Procedure:  EGD BIOPSY (N/A ) Diagnosis:  (?)    Surgeon:  Gianna Vernon DO Responsible Provider:  Mariano Hartman MD    Anesthesia Type:  general ASA Status:  3          Anesthesia Type: No value filed. Maggie Phase I: Maggie Score: 9    Maggie Phase II: Maggie Score: 8    Last vitals: Reviewed and per EMR flowsheets.        Anesthesia Post Evaluation    Comments: Postoperative Anesthesia Note    Name:    Cassidy Man  MRN:      [id number]    Patient Vitals in the past 12 hrs:  05/29/19 1722, Temp:97 °F (36.1 °C), Temp src:Oral, Pulse:72, Resp:20, SpO2:93 %  05/29/19 1659, BP:121/74, Pulse:71, Resp:20, SpO2:94 %  05/29/19 1650, BP:111/67, Pulse:70, Resp:21, SpO2:94 %  05/29/19 1636, BP:(!) 106/34, Pulse:70, Resp:20, SpO2:94 %  05/29/19 1631, BP:121/64, Pulse:76, Resp:18, SpO2:98 %  05/29/19 1626, BP:108/75, Pulse:71, Resp:16, SpO2:99 %  05/29/19 1621, BP:(!) 102/39, Temp:96.8 °F (36 °C), Temp src:Temporal, Pulse:72, Resp:16, SpO2:100 %  05/29/19 1455, BP:(!) 124/49, Temp:97 °F (36.1 °C), Temp src:Temporal, Pulse:70, Resp:16, SpO2:96 %  05/29/19 1313, BP:(!) 99/58, Temp:97.1 °F (36.2 °C), Temp src:Oral, Pulse:71, Resp:16, SpO2:96 %  05/29/19 0825, BP:96/62, Temp:97.9 °F (36.6 °C), Temp src:Oral, Pulse:71, Resp:16, SpO2:99 %     LABS:    CBC  Lab Results       Component                Value               Date/Time                  WBC                      8.7                 05/29/2019 05:25 AM        HGB                      7.1 (L)             05/29/2019 12:16 PM        HCT                      22.4 (L)            05/29/2019 05:25 AM        PLT                      176                 05/29/2019 05:25 AM   RENAL  Lab Results       Component                Value               Date/Time                  NA                       146 (H)             05/29/2019 05:25 AM        K                        4.6                 05/29/2019 05:25 AM        CL                       107                 05/29/2019 05:25 AM        CO2                      31                  05/29/2019 05:25 AM        BUN                      32 (H)              05/29/2019 05:25 AM        CREATININE               1.2                 05/29/2019 05:25 AM        GLUCOSE                  120 (H)             05/29/2019 05:25 AM   COAGS  No results found for: PROTIME, INR, APTT    Intake & Output:  @64PZSX@    Nausea & Vomiting:  No    Level of Consciousness:  Awake    Pain Assessment:  Adequate analgesia    Anesthesia Complications:  No apparent anesthetic complications    SUMMARY      Vital signs stable  OK to discharge from Stage I post anesthesia care.   Care transferred from Anesthesiology department on discharge from perioperative area

## 2019-05-29 NOTE — OP NOTE
Esophagogastroduodenoscopy Note    Patient:   Bhumi Falk    YOB: 1940    Facility:   [facility] [Inpatient]   Referring/PCP: Marine Martinez MD    Procedure:   Esophagogastroduodenoscopy --with biopsy  Date:     5/29/2019   Endoscopist:  Eliza Aguirre     Preoperative Diagnosis:   Melena    Postoperative Diagnosis:  Gastric erosion    Anesthesia:  MAC    Estimated blood loss: Minimal    Complications: None    Description of Procedure:  Informed consent was obtained from the patient after explanation of the procedure including indications, description of the procedure,  benefits and possible risks and complications of the procedure, and alternatives. Questions were answered. The patient's history was reviewed and a directed physical examination was performed prior to the procedure. Patient was monitored throughout the procedure with pulse oximetry and periodic assessment of vital signs. Patient was sedated as noted above. The Nursing staff and I performed a time out. With the patient in the left lateral decubitus position, the Olympus videoendoscope was placed in the patient's mouth and under direct visualization passed into the esophagus. The scope was ultimately passed to the second portion of the duodenum. Visualization was performed during both introduction and withdrawal of the endoscope and retroflexed view of the proximal stomach was obtained. Findings[de-identified]   Esophagus: normal. The findings do not support a diagnosis of Barrera's Esophagus. Stomach: Two clean based erosions seen. Biopsies taken of the stomach  Duodenum: normal    Recommendations:   -Acid suppression with a proton pump inhibitor.  - OK with discharge given clean based erosions.     Eliza Aguirre DO

## 2019-05-29 NOTE — PROGRESS NOTES
RESPIRATORY THERAPY ASSESSMENT    Name:  [facility] Record Number:  [id number]  Age: [de-identified] y.o. Gender: female  : 1940  Today's Date:  2019  Room:  0207/0207-02    Assessment     Is the patient being admitted for a COPD or Asthma exacerbation? No   (If yes the patient will be seen every 4 hours for the first 24 hours and then reassessed)    Patient Admission Diagnosis      Allergies  Allergies   Allergen Reactions    Demerol Hcl [Meperidine] Anxiety    Penicillins Nausea And Vomiting    Sulfur Nausea And Vomiting    Vicodin [Hydrocodone-Acetaminophen] Nausea And Vomiting       Minimum Predicted Vital Capacity:     952          Actual Vital Capacity:      450              Pulmonary History:COPD and CHF/Pulmonary Edema  Home Oxygen Therapy:  room air  Home Respiratory Therapy:Albuterol/Ipratropium Bromide HHN   Current Respiratory Therapy:  Duoneb Q4 PRN          Respiratory Severity Index(RSI)   Patients with orders for inhalation medications, oxygen, or any therapeutic treatment modality will be placed on Respiratory Protocol. They will be assessed with the first treatment and at least every 72 hours thereafter. The following severity scale will be used to determine frequency of treatment intervention.     Smoking History: No Smoking History = 0    Social History  Social History     Tobacco Use    Smoking status: Never Smoker    Smokeless tobacco: Never Used   Substance Use Topics    Alcohol use: Not Currently     Alcohol/week: 0.0 oz    Drug use: Not Currently       Recent Surgical History: None = 0  Past Surgical History  Past Surgical History:   Procedure Laterality Date    CHOLECYSTECTOMY      HYSTERECTOMY      LAP BAND      WRIST SURGERY Right 10/29/15    ORIF right wrist with allograft bone grafting       Level of Consciousness: Alert, Oriented, and Cooperative = 0    Level of Activity: Walking with assistance = 1    Respiratory Pattern: Regular Pattern; RR 8-20 = (HHN) to patients when ANY of the following criteria are met  a. Incognizant or uncooperative          b. Patients treated with HHN at Home        c. Unable to demonstrate proper use of MDI with spacer     d. RR > 30 bpm   5. Bronchodilators will be delivered via Metered Dose Inhaler (MDI), HHN, Aerogen to intubated patients on mechanical ventilation. 6. Inhalation medication orders will be delivered and/or substituted as outlined below. Aerosolized Medications Ordering and Administration Guidelines:    1. All Medications will be ordered by a physician, and their frequency and/or modality will be adjusted as defined by the patients Respiratory Severity Index (RSI) score. 2. If the patient does not have documented COPD, consider discontinuing anticholinergics when RSI is less than 9.  3. If the bronchospasm worsens (increased RSI), then the bronchodilator frequency can be increased to a maximum of every 4 hours. If greater than every 4 hours is required, the physician will be contacted. 4. If the bronchospasm improves, the frequency of the bronchodilator can be decreased, based on the patient's RSI, but not less than home treatment regimen frequency. 5. Bronchodilator(s) will be discontinued if patient has a RSI less than 9 and has received no scheduled or as needed treatment for 72  Hrs. Patients Ordered on a Mucolytic Agent:    1. Must always be administered with a bronchodilator. 2. Discontinue if patient experiences worsened bronchospasm, or secretions have lessened to the point that the patient is able to clear them with a cough. Anti-inflammatory and Combination Medications:    1. If the patient lacks prior history of lung disease, is not using inhaled anti-inflammatory medication at home, and lacks wheezing by examination or by history for at least 24 hours, contact physician for possible discontinuation.

## 2019-05-30 VITALS
SYSTOLIC BLOOD PRESSURE: 116 MMHG | HEIGHT: 68 IN | DIASTOLIC BLOOD PRESSURE: 71 MMHG | BODY MASS INDEX: 29.25 KG/M2 | OXYGEN SATURATION: 99 % | RESPIRATION RATE: 18 BRPM | HEART RATE: 69 BPM | TEMPERATURE: 97.3 F | WEIGHT: 193 LBS

## 2019-05-30 LAB
ANION GAP SERPL CALCULATED.3IONS-SCNC: 9 MMOL/L (ref 3–16)
BASOPHILS ABSOLUTE: 0 K/UL (ref 0–0.2)
BASOPHILS RELATIVE PERCENT: 0.4 %
BUN BLDV-MCNC: 32 MG/DL (ref 7–20)
CALCIUM SERPL-MCNC: 8.8 MG/DL (ref 8.3–10.6)
CHLORIDE BLD-SCNC: 103 MMOL/L (ref 99–110)
CO2: 30 MMOL/L (ref 21–32)
CREAT SERPL-MCNC: 0.9 MG/DL (ref 0.6–1.2)
EOSINOPHILS ABSOLUTE: 0.2 K/UL (ref 0–0.6)
EOSINOPHILS RELATIVE PERCENT: 2.8 %
GFR AFRICAN AMERICAN: >60
GFR NON-AFRICAN AMERICAN: >60
GLUCOSE BLD-MCNC: 120 MG/DL (ref 70–99)
HCT VFR BLD CALC: 22.2 % (ref 36–48)
HEMOGLOBIN: 7.3 G/DL (ref 12–16)
HEMOGLOBIN: 7.8 G/DL (ref 12–16)
HEMOGLOBIN: 8.1 G/DL (ref 12–16)
LYMPHOCYTES ABSOLUTE: 1.2 K/UL (ref 1–5.1)
LYMPHOCYTES RELATIVE PERCENT: 14.2 %
MCH RBC QN AUTO: 31.3 PG (ref 26–34)
MCHC RBC AUTO-ENTMCNC: 33.1 G/DL (ref 31–36)
MCV RBC AUTO: 94.6 FL (ref 80–100)
MONOCYTES ABSOLUTE: 0.8 K/UL (ref 0–1.3)
MONOCYTES RELATIVE PERCENT: 9.7 %
NEUTROPHILS ABSOLUTE: 6.2 K/UL (ref 1.7–7.7)
NEUTROPHILS RELATIVE PERCENT: 72.9 %
PDW BLD-RTO: 14.6 % (ref 12.4–15.4)
PLATELET # BLD: 172 K/UL (ref 135–450)
PMV BLD AUTO: 8.1 FL (ref 5–10.5)
POTASSIUM REFLEX MAGNESIUM: 4 MMOL/L (ref 3.5–5.1)
RBC # BLD: 2.35 M/UL (ref 4–5.2)
SODIUM BLD-SCNC: 142 MMOL/L (ref 136–145)
WBC # BLD: 8.5 K/UL (ref 4–11)

## 2019-05-30 PROCEDURE — 99238 HOSP IP/OBS DSCHRG MGMT 30/<: CPT | Performed by: INTERNAL MEDICINE

## 2019-05-30 PROCEDURE — 2500000003 HC RX 250 WO HCPCS: Performed by: PHYSICIAN ASSISTANT

## 2019-05-30 PROCEDURE — 2580000003 HC RX 258: Performed by: PHYSICIAN ASSISTANT

## 2019-05-30 PROCEDURE — 85018 HEMOGLOBIN: CPT

## 2019-05-30 PROCEDURE — 80048 BASIC METABOLIC PNL TOTAL CA: CPT

## 2019-05-30 PROCEDURE — G0009 ADMIN PNEUMOCOCCAL VACCINE: HCPCS | Performed by: INTERNAL MEDICINE

## 2019-05-30 PROCEDURE — 90670 PCV13 VACCINE IM: CPT | Performed by: INTERNAL MEDICINE

## 2019-05-30 PROCEDURE — 85025 COMPLETE CBC W/AUTO DIFF WBC: CPT

## 2019-05-30 PROCEDURE — 6360000002 HC RX W HCPCS: Performed by: INTERNAL MEDICINE

## 2019-05-30 PROCEDURE — 36415 COLL VENOUS BLD VENIPUNCTURE: CPT

## 2019-05-30 PROCEDURE — 6370000000 HC RX 637 (ALT 250 FOR IP): Performed by: INTERNAL MEDICINE

## 2019-05-30 RX ORDER — PANTOPRAZOLE SODIUM 40 MG/1
40 TABLET, DELAYED RELEASE ORAL 2 TIMES DAILY
DISCHARGE
Start: 2019-05-30

## 2019-05-30 RX ADMIN — PNEUMOCOCCAL 13-VALENT CONJUGATE VACCINE 0.5 ML: 2.2; 2.2; 2.2; 2.2; 2.2; 4.4; 2.2; 2.2; 2.2; 2.2; 2.2; 2.2; 2.2 INJECTION, SUSPENSION INTRAMUSCULAR at 08:08

## 2019-05-30 RX ADMIN — Medication 10 ML: at 08:08

## 2019-05-30 RX ADMIN — POTASSIUM CHLORIDE 10 MEQ: 10 TABLET, EXTENDED RELEASE ORAL at 08:08

## 2019-05-30 RX ADMIN — SODIUM CHLORIDE 8 MG/HR: 9 INJECTION, SOLUTION INTRAVENOUS at 05:39

## 2019-05-30 RX ADMIN — LEVOTHYROXINE SODIUM 25 MCG: 25 TABLET ORAL at 05:39

## 2019-05-30 NOTE — DISCHARGE SUMMARY
of pelvis  Diverticulosis, no diverticulitis  Status post gastric banding. No evidence of complication       CBC:   Recent Labs     05/29/19  0525  05/29/19  2352 05/30/19  0607 05/30/19  1256   WBC 8.7  --   --  8.5  --    RBC 2.34*  --   --  2.35*  --    HGB 7.3*   < > 7.8* 7.3* 8.1*   HCT 22.4*  --   --  22.2*  --    MCV 95.9  --   --  94.6  --    RDW 14.8  --   --  14.6  --      --   --  172  --     < > = values in this interval not displayed. BMP:   Recent Labs     05/29/19  0525 05/30/19  0607   * 142   K 4.6 4.0    103   CO2 31 30   BUN 32* 32*   CREATININE 1.2 0.9           Treatments: as above    Discharge Exam:  Blood pressure 116/71, pulse 69, temperature 97.3 °F (36.3 °C), temperature source Oral, resp. rate 18, height 5' 8\" (1.727 m), weight 193 lb (87.5 kg), SpO2 99 %. Gen: Elderly female who is pleasantly confused. No distress. Alert. Eyes: PERRL. No sclera icterus. No conjunctival injection. ENT: No discharge. Pharynx clear. Neck: No JVD. No Carotid Bruit. Trachea midline. Resp: No accessory muscle use. + mild crackles. No wheezes. No rhonchi. CV: Regular rate. Regular rhythm. No murmur. No rub. No edema. GI: Non-tender. Non-distended. Old healed surgical scars. Soft tissue abnormality, ?scar tissue- palpated in RUQ  + BS   No hernia   Skin: Warm and dry. No nodule on exposed extremities. No rash on exposed extremities. M/S: No cyanosis. No joint deformity. No clubbing. Neuro: Awake. Grossly nonfocal    Psych: Oriented to self only. No anxiety or agitation.          Disposition: [facility]        Patient Instructions:      Medication List      CHANGE how you take these medications    aspirin 81 MG tablet  Take 1 tablet by mouth daily Resume after 1 week  Start taking on:  6/6/2019  What changed:    · additional instructions  · These instructions start on 6/6/2019. If you are unsure what to do until then, ask your doctor or other care provider.      pantoprazole 40 MG tablet  Commonly known as:  PROTONIX  Take 1 tablet by mouth 2 times daily  What changed:    · how to take this  · when to take this        CONTINUE taking these medications    acetaminophen 325 MG tablet  Commonly known as:  TYLENOL     BENADRYL ALLERGY 25 MG tablet  Generic drug:  diphenhydrAMINE     carvedilol 6.25 MG tablet  Commonly known as:  COREG     docusate sodium 100 MG capsule  Commonly known as:  COLACE     furosemide 40 MG tablet  Commonly known as:  LASIX     ipratropium-albuterol 0.5-2.5 (3) MG/3ML Soln nebulizer solution  Commonly known as:  DUONEB     isosorbide dinitrate 10 MG tablet  Commonly known as:  ISORDIL     levothyroxine 25 MCG tablet  Commonly known as:  SYNTHROID     lidocaine 5 % ointment  Commonly known as:  XYLOCAINE     nitroGLYCERIN 0.4 MG SL tablet  Commonly known as:  NITROSTAT     potassium chloride 10 MEQ extended release capsule  Commonly known as:  MICRO-K     simvastatin 40 MG tablet  Commonly known as:  ZOCOR     therapeutic multivitamin-minerals tablet     vitamin B-12 100 MCG tablet  Commonly known as:  CYANOCOBALAMIN        STOP taking these medications    ZANTAC PO           Where to Get Your Medications      Information about where to get these medications is not yet available    Ask your nurse or doctor about these medications  · aspirin 81 MG tablet  · pantoprazole 40 MG tablet       Activity: activity as tolerated  Diet: regular diet      Follow-up with CARISSA ARIZA      Signed:  Mahsa Hughes  5/30/2019  1:36 PM

## 2019-05-30 NOTE — PROGRESS NOTES
Blood stopped at this time. Drink provided. V.s.s. Repositioned. Pt at baseline for mentation. Denies other needs.

## 2019-05-30 NOTE — PROGRESS NOTES
Shift assessment complete. See doc flow. Medications given see MAR. Patient with no complaints at this time. Call light and bedside table within easy reach.

## 2019-05-30 NOTE — CARE COORDINATION
DISCHARGE ORDER  Date/Time 2019 2:48 PM  Completed by: Kane Laurent, Case Management    Patient Name: Lynne Mcintyre    : 1940  Admitting Diagnosis: GI bleed [K92.2]  Admit Date/Time: 2019  3:06 PM    Noted discharge order. Confirmed discharge plan with patient / family (pt and son, Juan R Cagle): Yes   Discharge Plan: Reviewed chart. Role of discharge planner explained and patient and Adam verbalized understanding. Discharge order is noted. Pt is being d/c'd back to [facility]note 3[facility] today. Pt's O2 sats are 99% on RA. Faxed [facility] to [phone] and [phone]. CM called Shireen Resides with [facility] and informed her that pt is discharging at [phone]. Pt and son did not have a preference of ambulance company. No further discharge needs needed or noted. Discharge orders and Continuity of Care faxed to facility: Yes  Hospital Exemption Notification System complete: no, as pt is returning  Transportation arranged: Yes - Robby Zapata @ [phone]. Patient / Family (pt and Juan R Cagle) aware of  time: Yes   Nursing aware of  time: Yes  Receiving facility aware of  time: Yes  Pre-cert obtained?   No because she is LTC

## 2019-05-30 NOTE — PLAN OF CARE
Problem: Falls - Risk of:  Goal: Will remain free from falls  Description  Will remain free from falls  5/30/2019 0940 by Shekhar Canales RN  Outcome: Ongoing   Patient will use call light in advance of needs and wait for staff prior to getting out of bed. Problem: Infection:  Goal: Will remain free from infection  Description  Will remain free from infection  5/30/2019 0940 by Shekhar Canales RN  Outcome: Ongoing     Problem: Safety:  Goal: Free from accidental physical injury  Description  Free from accidental physical injury  5/30/2019 0940 by Shekhar Canales RN  Outcome: Ongoing   Patient will use call light in advance of needs and wait for staff prior to getting out of bed. Problem: Daily Care:  Goal: Daily care needs are met  Description  Daily care needs are met  5/30/2019 0940 by Shekhar Canales RN  Outcome: Ongoing     Problem: Pain:  Goal: Patient's pain/discomfort is manageable  Description  Patient's pain/discomfort is manageable  5/30/2019 0940 by Shekhar Canales RN  Outcome: Ongoing   Patient will be monitored for pain each shift. Problem: Skin Integrity:  Goal: Skin integrity will stabilize  Description  Skin integrity will stabilize  5/30/2019 0940 by Shekhar Canales RN  Outcome: Ongoing   Patient will be encouraged to change positions every 2 hours and assisted when needed.      Problem: Fluid Volume - Imbalance:  Goal: Will show no signs and symptoms of excessive bleeding  Description  Will show no signs and symptoms of excessive bleeding  5/30/2019 0940 by Shekhar Canales RN  Outcome: Ongoing

## 2019-05-30 NOTE — PLAN OF CARE
Problem: Falls - Risk of:  Goal: Will remain free from falls  Description  Will remain free from falls  5/29/2019 2001 by Dewayne Bunch RN  Outcome: Ongoing  5/29/2019 1343 by Wilder Parada RN  Outcome: Met This Shift  Note:   Pt high fall risk. Fall prevention measures in place. Bed alarm on. Call light and bedside table within reach. Bed wheels locked and bed in low position. Goal: Absence of physical injury  Description  Absence of physical injury  5/29/2019 2001 by Dewayne Bunch RN  Outcome: Ongoing  5/29/2019 1343 by Wilder Parada RN  Outcome: Met This Shift     Problem: Infection:  Goal: Will remain free from infection  Description  Will remain free from infection  Outcome: Ongoing     Problem: Safety:  Goal: Free from accidental physical injury  Description  Free from accidental physical injury  5/29/2019 2001 by Dewayne Bunch RN  Outcome: Ongoing  5/29/2019 1343 by Wilder Parada RN  Outcome: Met This Shift  Goal: Free from intentional harm  Description  Free from intentional harm  5/29/2019 2001 by Dewayne Bunch RN  Outcome: Ongoing  5/29/2019 1343 by Wilder Parada RN  Outcome: Met This Shift     Problem: Daily Care:  Goal: Daily care needs are met  Description  Daily care needs are met  Outcome: Ongoing     Problem: Pain:  Goal: Patient's pain/discomfort is manageable  Description  Patient's pain/discomfort is manageable  Outcome: Ongoing     Problem: Skin Integrity:  Goal: Skin integrity will stabilize  Description  Skin integrity will stabilize  5/29/2019 2001 by Dewayne Bunch RN  Outcome: Ongoing  5/29/2019 1343 by Wilder Parada RN  Outcome: Met This Shift  Note:   No new s/s of skin breakdown noted. Pt turns self and encouraged to do so every 2 hours and prn.       Problem: Discharge Planning:  Goal: Patients continuum of care needs are met  Description  Patients continuum of care needs are met  Outcome: Ongoing     Problem: Discharge Planning:  Goal: Discharged to appropriate level of

## 2019-05-30 NOTE — PROGRESS NOTES
4 Eyes Skin Assessment     The patient is being assess for   Transfer to [address] [place] [address]. I agree that 2 RN's have performed a thorough Head to Toe Skin Assessment on the patient. ALL assessment sites listed below have been assessed. Areas assessed by both nurses:   [x]   Head, Face, and Ears   [x]   Shoulders, Back, and Chest, Abdomen  [x]   Arms, Elbows, and Hands   [x]   Coccyx, Sacrum, and Ischium  [x]   Legs, Feet, and Heels        Scattered bruising    **SHARE this note so that the co-signing nurse is able to place an eSignature**    Co-signer eSignature: Electronically signed by Radha Holloway RN on 5/30/19 at 3:50 PM    Does the Patient have Skin Breakdown?   No          Gutierrez Prevention initiated:  No   Wound Care Orders initiated:  No      [facility] nurse consulted for Pressure Injury (Stage 3,4, Unstageable, DTI, NWPT, Complex wounds)and New or Established Ostomies:  No      Primary Nurse eSignature: Electronically signed by James Sage RN on 5/30/19 at 2:55 PM

## 2019-05-30 NOTE — PROGRESS NOTES
Patient out of facility at this time via transport. Report given to Nursing home nurse with no questions or concerns. Paper work sent with patient.

## 2019-05-30 NOTE — PROGRESS NOTES
Patient checked changed and repositioned. Very large UO with medium amount of black output noted. No s/s of distress at this time. Call light and bedside table within reach. Will continue to monitor.

## 2019-06-01 ENCOUNTER — HOSPITAL ENCOUNTER (INPATIENT)
Age: 79
LOS: 2 days | Discharge: NURSING, REHAB OR LONG-TERM CARE | DRG: 811 | End: 2019-06-03
Attending: INTERNAL MEDICINE | Admitting: INTERNAL MEDICINE
Payer: MEDICARE

## 2019-06-01 ENCOUNTER — APPOINTMENT (OUTPATIENT)
Dept: GENERAL RADIOLOGY | Age: 79
DRG: 811 | End: 2019-06-01
Attending: INTERNAL MEDICINE
Payer: MEDICARE

## 2019-06-01 LAB
A/G RATIO: 1.6 (ref 1.1–2.2)
ABO/RH: NORMAL
ALBUMIN SERPL-MCNC: 3.1 G/DL (ref 3.4–5)
ALP BLD-CCNC: 74 U/L (ref 40–129)
ALT SERPL-CCNC: 7 U/L (ref 10–40)
ANION GAP SERPL CALCULATED.3IONS-SCNC: 7 MMOL/L (ref 3–16)
ANTIBODY SCREEN: NORMAL
APTT: 30.1 SEC (ref 26–36)
AST SERPL-CCNC: 17 U/L (ref 15–37)
BASOPHILS ABSOLUTE: 0.1 K/UL (ref 0–0.2)
BASOPHILS RELATIVE PERCENT: 0.7 %
BILIRUB SERPL-MCNC: 0.4 MG/DL (ref 0–1)
BLOOD BANK DISPENSE STATUS: NORMAL
BLOOD BANK PRODUCT CODE: NORMAL
BPU ID: NORMAL
BUN BLDV-MCNC: 25 MG/DL (ref 7–20)
CALCIUM SERPL-MCNC: 8.6 MG/DL (ref 8.3–10.6)
CHLORIDE BLD-SCNC: 104 MMOL/L (ref 99–110)
CO2: 31 MMOL/L (ref 21–32)
CREAT SERPL-MCNC: 0.9 MG/DL (ref 0.6–1.2)
DESCRIPTION BLOOD BANK: NORMAL
EOSINOPHILS ABSOLUTE: 0.2 K/UL (ref 0–0.6)
EOSINOPHILS RELATIVE PERCENT: 2.5 %
GFR AFRICAN AMERICAN: >60
GFR NON-AFRICAN AMERICAN: >60
GLOBULIN: 2 G/DL
GLUCOSE BLD-MCNC: 113 MG/DL (ref 70–99)
HCT VFR BLD CALC: 21 % (ref 36–48)
HEMOGLOBIN: 7 G/DL (ref 12–16)
HEMOGLOBIN: 7.4 G/DL (ref 12–16)
HEMOGLOBIN: 9.1 G/DL (ref 12–16)
INR BLD: 1 (ref 0.86–1.14)
LYMPHOCYTES ABSOLUTE: 1.5 K/UL (ref 1–5.1)
LYMPHOCYTES RELATIVE PERCENT: 17.2 %
MCH RBC QN AUTO: 31.8 PG (ref 26–34)
MCHC RBC AUTO-ENTMCNC: 33.4 G/DL (ref 31–36)
MCV RBC AUTO: 95.3 FL (ref 80–100)
MONOCYTES ABSOLUTE: 0.7 K/UL (ref 0–1.3)
MONOCYTES RELATIVE PERCENT: 8.8 %
NEUTROPHILS ABSOLUTE: 6 K/UL (ref 1.7–7.7)
NEUTROPHILS RELATIVE PERCENT: 70.8 %
PDW BLD-RTO: 16 % (ref 12.4–15.4)
PLATELET # BLD: 192 K/UL (ref 135–450)
PMV BLD AUTO: 7.9 FL (ref 5–10.5)
POTASSIUM REFLEX MAGNESIUM: 3.7 MMOL/L (ref 3.5–5.1)
PROTHROMBIN TIME: 11.4 SEC (ref 9.8–13)
RBC # BLD: 2.2 M/UL (ref 4–5.2)
SODIUM BLD-SCNC: 142 MMOL/L (ref 136–145)
TOTAL PROTEIN: 5.1 G/DL (ref 6.4–8.2)
WBC # BLD: 8.4 K/UL (ref 4–11)

## 2019-06-01 PROCEDURE — 94761 N-INVAS EAR/PLS OXIMETRY MLT: CPT

## 2019-06-01 PROCEDURE — 87186 SC STD MICRODIL/AGAR DIL: CPT

## 2019-06-01 PROCEDURE — 94640 AIRWAY INHALATION TREATMENT: CPT

## 2019-06-01 PROCEDURE — 2580000003 HC RX 258: Performed by: INTERNAL MEDICINE

## 2019-06-01 PROCEDURE — 6370000000 HC RX 637 (ALT 250 FOR IP): Performed by: INTERNAL MEDICINE

## 2019-06-01 PROCEDURE — P9016 RBC LEUKOCYTES REDUCED: HCPCS

## 2019-06-01 PROCEDURE — 6360000002 HC RX W HCPCS: Performed by: INTERNAL MEDICINE

## 2019-06-01 PROCEDURE — 86901 BLOOD TYPING SEROLOGIC RH(D): CPT

## 2019-06-01 PROCEDURE — 36415 COLL VENOUS BLD VENIPUNCTURE: CPT

## 2019-06-01 PROCEDURE — 86850 RBC ANTIBODY SCREEN: CPT

## 2019-06-01 PROCEDURE — 80053 COMPREHEN METABOLIC PANEL: CPT

## 2019-06-01 PROCEDURE — 2580000003 HC RX 258

## 2019-06-01 PROCEDURE — 86923 COMPATIBILITY TEST ELECTRIC: CPT

## 2019-06-01 PROCEDURE — 2700000000 HC OXYGEN THERAPY PER DAY

## 2019-06-01 PROCEDURE — 85025 COMPLETE CBC W/AUTO DIFF WBC: CPT

## 2019-06-01 PROCEDURE — 7100000011 HC PHASE II RECOVERY - ADDTL 15 MIN: Performed by: INTERNAL MEDICINE

## 2019-06-01 PROCEDURE — 87086 URINE CULTURE/COLONY COUNT: CPT

## 2019-06-01 PROCEDURE — 99152 MOD SED SAME PHYS/QHP 5/>YRS: CPT | Performed by: INTERNAL MEDICINE

## 2019-06-01 PROCEDURE — 85730 THROMBOPLASTIN TIME PARTIAL: CPT

## 2019-06-01 PROCEDURE — 86900 BLOOD TYPING SEROLOGIC ABO: CPT

## 2019-06-01 PROCEDURE — 3609017100 HC EGD: Performed by: INTERNAL MEDICINE

## 2019-06-01 PROCEDURE — 2709999900 HC NON-CHARGEABLE SUPPLY: Performed by: INTERNAL MEDICINE

## 2019-06-01 PROCEDURE — 2500000003 HC RX 250 WO HCPCS: Performed by: INTERNAL MEDICINE

## 2019-06-01 PROCEDURE — 74018 RADEX ABDOMEN 1 VIEW: CPT

## 2019-06-01 PROCEDURE — 87077 CULTURE AEROBIC IDENTIFY: CPT

## 2019-06-01 PROCEDURE — 85610 PROTHROMBIN TIME: CPT

## 2019-06-01 PROCEDURE — 7100000010 HC PHASE II RECOVERY - FIRST 15 MIN: Performed by: INTERNAL MEDICINE

## 2019-06-01 PROCEDURE — 36430 TRANSFUSION BLD/BLD COMPNT: CPT

## 2019-06-01 PROCEDURE — 85018 HEMOGLOBIN: CPT

## 2019-06-01 PROCEDURE — 2060000000 HC ICU INTERMEDIATE R&B

## 2019-06-01 RX ORDER — IPRATROPIUM BROMIDE AND ALBUTEROL SULFATE 2.5; .5 MG/3ML; MG/3ML
1 SOLUTION RESPIRATORY (INHALATION) EVERY 4 HOURS PRN
Status: DISCONTINUED | OUTPATIENT
Start: 2019-06-01 | End: 2019-06-01

## 2019-06-01 RX ORDER — ESOMEPRAZOLE SODIUM 40 MG/5ML
40 INJECTION INTRAVENOUS 2 TIMES DAILY
Status: DISCONTINUED | OUTPATIENT
Start: 2019-06-01 | End: 2019-06-03 | Stop reason: HOSPADM

## 2019-06-01 RX ORDER — ONDANSETRON 2 MG/ML
4 INJECTION INTRAMUSCULAR; INTRAVENOUS EVERY 6 HOURS PRN
Status: DISCONTINUED | OUTPATIENT
Start: 2019-06-01 | End: 2019-06-03 | Stop reason: HOSPADM

## 2019-06-01 RX ORDER — PANTOPRAZOLE SODIUM 40 MG/10ML
40 INJECTION, POWDER, LYOPHILIZED, FOR SOLUTION INTRAVENOUS 2 TIMES DAILY
Status: DISCONTINUED | OUTPATIENT
Start: 2019-06-01 | End: 2019-06-01 | Stop reason: CLARIF

## 2019-06-01 RX ORDER — DULOXETIN HYDROCHLORIDE 30 MG/1
30 CAPSULE, DELAYED RELEASE ORAL DAILY
COMMUNITY

## 2019-06-01 RX ORDER — SODIUM CHLORIDE 9 MG/ML
INJECTION, SOLUTION INTRAVENOUS
Status: COMPLETED
Start: 2019-06-01 | End: 2019-06-01

## 2019-06-01 RX ORDER — SODIUM CHLORIDE 9 MG/ML
INJECTION, SOLUTION INTRAVENOUS CONTINUOUS
Status: DISPENSED | OUTPATIENT
Start: 2019-06-01 | End: 2019-06-01

## 2019-06-01 RX ORDER — IPRATROPIUM BROMIDE AND ALBUTEROL SULFATE 2.5; .5 MG/3ML; MG/3ML
1 SOLUTION RESPIRATORY (INHALATION) 4 TIMES DAILY
Status: DISCONTINUED | OUTPATIENT
Start: 2019-06-01 | End: 2019-06-03 | Stop reason: HOSPADM

## 2019-06-01 RX ORDER — SODIUM CHLORIDE 0.9 % (FLUSH) 0.9 %
10 SYRINGE (ML) INJECTION EVERY 12 HOURS SCHEDULED
Status: DISCONTINUED | OUTPATIENT
Start: 2019-06-01 | End: 2019-06-03 | Stop reason: HOSPADM

## 2019-06-01 RX ORDER — ACETAMINOPHEN 325 MG/1
650 TABLET ORAL EVERY 4 HOURS PRN
Status: DISCONTINUED | OUTPATIENT
Start: 2019-06-01 | End: 2019-06-03 | Stop reason: HOSPADM

## 2019-06-01 RX ORDER — LEVOTHYROXINE SODIUM 0.03 MG/1
25 TABLET ORAL DAILY
Status: DISCONTINUED | OUTPATIENT
Start: 2019-06-01 | End: 2019-06-03 | Stop reason: HOSPADM

## 2019-06-01 RX ORDER — MIDAZOLAM HYDROCHLORIDE 5 MG/ML
INJECTION INTRAMUSCULAR; INTRAVENOUS PRN
Status: DISCONTINUED | OUTPATIENT
Start: 2019-06-01 | End: 2019-06-01 | Stop reason: ALTCHOICE

## 2019-06-01 RX ORDER — DOCUSATE SODIUM 100 MG/1
100 CAPSULE, LIQUID FILLED ORAL 2 TIMES DAILY PRN
Status: DISCONTINUED | OUTPATIENT
Start: 2019-06-01 | End: 2019-06-03 | Stop reason: HOSPADM

## 2019-06-01 RX ORDER — MORPHINE SULFATE 4 MG/ML
2 INJECTION, SOLUTION INTRAMUSCULAR; INTRAVENOUS
Status: DISCONTINUED | OUTPATIENT
Start: 2019-06-01 | End: 2019-06-03 | Stop reason: HOSPADM

## 2019-06-01 RX ORDER — CARVEDILOL 6.25 MG/1
6.25 TABLET ORAL 2 TIMES DAILY WITH MEALS
Status: DISCONTINUED | OUTPATIENT
Start: 2019-06-01 | End: 2019-06-03 | Stop reason: HOSPADM

## 2019-06-01 RX ORDER — NITROGLYCERIN 0.4 MG/1
0.4 TABLET SUBLINGUAL EVERY 5 MIN PRN
Status: DISCONTINUED | OUTPATIENT
Start: 2019-06-01 | End: 2019-06-03 | Stop reason: HOSPADM

## 2019-06-01 RX ORDER — FENTANYL CITRATE 50 UG/ML
INJECTION, SOLUTION INTRAMUSCULAR; INTRAVENOUS PRN
Status: DISCONTINUED | OUTPATIENT
Start: 2019-06-01 | End: 2019-06-01 | Stop reason: ALTCHOICE

## 2019-06-01 RX ORDER — ISOSORBIDE DINITRATE 10 MG/1
10 TABLET ORAL 2 TIMES DAILY
Status: DISCONTINUED | OUTPATIENT
Start: 2019-06-01 | End: 2019-06-03 | Stop reason: HOSPADM

## 2019-06-01 RX ORDER — NYSTATIN 100000 [USP'U]/G
1000 POWDER TOPICAL 2 TIMES DAILY
COMMUNITY

## 2019-06-01 RX ORDER — UBIDECARENONE 75 MG
200 CAPSULE ORAL DAILY
Status: DISCONTINUED | OUTPATIENT
Start: 2019-06-01 | End: 2019-06-03 | Stop reason: HOSPADM

## 2019-06-01 RX ORDER — SIMVASTATIN 40 MG
40 TABLET ORAL NIGHTLY
Status: DISCONTINUED | OUTPATIENT
Start: 2019-06-01 | End: 2019-06-03 | Stop reason: HOSPADM

## 2019-06-01 RX ORDER — DIPHENHYDRAMINE HCL 25 MG
25 TABLET ORAL EVERY 4 HOURS PRN
Status: DISCONTINUED | OUTPATIENT
Start: 2019-06-01 | End: 2019-06-03 | Stop reason: HOSPADM

## 2019-06-01 RX ORDER — 0.9 % SODIUM CHLORIDE 0.9 %
250 INTRAVENOUS SOLUTION INTRAVENOUS ONCE
Status: COMPLETED | OUTPATIENT
Start: 2019-06-01 | End: 2019-06-01

## 2019-06-01 RX ORDER — SODIUM CHLORIDE 0.9 % (FLUSH) 0.9 %
10 SYRINGE (ML) INJECTION PRN
Status: DISCONTINUED | OUTPATIENT
Start: 2019-06-01 | End: 2019-06-03 | Stop reason: HOSPADM

## 2019-06-01 RX ADMIN — BISACODYL 20 MG: 5 TABLET, COATED ORAL at 12:16

## 2019-06-01 RX ADMIN — Medication 10 ML: at 21:13

## 2019-06-01 RX ADMIN — CARVEDILOL 6.25 MG: 6.25 TABLET, FILM COATED ORAL at 12:08

## 2019-06-01 RX ADMIN — ESOMEPRAZOLE SODIUM 40 MG: 40 INJECTION, POWDER, LYOPHILIZED, FOR SOLUTION INTRAVENOUS at 21:13

## 2019-06-01 RX ADMIN — ISOSORBIDE DINITRATE 10 MG: 10 TABLET ORAL at 12:08

## 2019-06-01 RX ADMIN — ISOSORBIDE DINITRATE 10 MG: 10 TABLET ORAL at 17:29

## 2019-06-01 RX ADMIN — ESOMEPRAZOLE SODIUM 40 MG: 40 INJECTION, POWDER, LYOPHILIZED, FOR SOLUTION INTRAVENOUS at 12:08

## 2019-06-01 RX ADMIN — SIMVASTATIN 40 MG: 40 TABLET, FILM COATED ORAL at 21:13

## 2019-06-01 RX ADMIN — SODIUM CHLORIDE 1000 ML: 9 INJECTION, SOLUTION INTRAVENOUS at 08:07

## 2019-06-01 RX ADMIN — CARVEDILOL 6.25 MG: 6.25 TABLET, FILM COATED ORAL at 17:29

## 2019-06-01 RX ADMIN — SODIUM CHLORIDE 250 ML: 9 INJECTION, SOLUTION INTRAVENOUS at 14:49

## 2019-06-01 RX ADMIN — Medication 10 ML: at 12:09

## 2019-06-01 RX ADMIN — IPRATROPIUM BROMIDE AND ALBUTEROL SULFATE 3 ML: .5; 3 SOLUTION RESPIRATORY (INHALATION) at 19:37

## 2019-06-01 RX ADMIN — POLYETHYLENE GLYCOL-3350 AND ELECTROLYTES 2000 ML: 236; 6.74; 5.86; 2.97; 22.74 POWDER, FOR SOLUTION ORAL at 13:02

## 2019-06-01 RX ADMIN — IPRATROPIUM BROMIDE AND ALBUTEROL SULFATE 3 ML: .5; 3 SOLUTION RESPIRATORY (INHALATION) at 15:47

## 2019-06-01 ASSESSMENT — PAIN - FUNCTIONAL ASSESSMENT: PAIN_FUNCTIONAL_ASSESSMENT: 0-10

## 2019-06-01 ASSESSMENT — PAIN SCALES - GENERAL
PAINLEVEL_OUTOF10: 0

## 2019-06-01 NOTE — PROGRESS NOTES
Pt to PCU in stable condition per transport. Pt easily arouses to verbal stimuli. Respirations easy and regular. Skin warm and dry. Blankets in place to keep pt warm.

## 2019-06-01 NOTE — PROGRESS NOTES
1 unit PRBC administration started, and no evidence of reaction apparent at this time. Rate increased to 125ml/hr and pt tolerating well. VSS. She is at 98% on 2L NC, titrated down to 1L at this time. Please see flow sheet for greater detail. Will continue to closely monitor.

## 2019-06-01 NOTE — H&P
Hospital Medicine History & Physical      PCP: Robyn Bentley MD    Date of Service: Pt seen/examined on 6/1/19 and admitted on 6/1/19 to Inpatient    CC:  Anemia, BRBPR and melena (per documentation)    History Of Present Illness: The patient is a [de-identified] y.o. female with PMH below, presents with BRBPR/melena. Pt was DC yesterday following admit for GI bleed w/ plan for PO PPI BID for 8 weeks. She underwent EGD on 5/29 which showed gastric erosions. She was last transfused yesterday prior to DC. SNF sent her to [facility] ED for BRBPR\melena and low HGB of 6.2. HGB yd was 8.1 after transfusion. She has hx of dementia and does not know why she is here. She is not able to give helpful hx. She is DNR-CCA. Past Medical History:        Diagnosis Date    A-fib [facility])     [facility]     CAD (coronary artery disease)     Cerebral artery occlusion with cerebral infarction [facility]     CHF (congestive heart failure) (HCC)     COPD (chronic obstructive pulmonary disease) [facility]     Dementia     Hyperlipidemia     Hypertension     Pacemaker     UTI (urinary tract infection)        Past Surgical History:        Procedure Laterality Date    CHOLECYSTECTOMY      HYSTERECTOMY      LAP BAND      UPPER GASTROINTESTINAL ENDOSCOPY  05/29/2019    Gastric Erosions    UPPER GASTROINTESTINAL ENDOSCOPY N/A 5/29/2019    EGD BIOPSY performed by Katiana Sorensen DO at [address] 10/29/15    ORIF right wrist with allograft bone grafting       Medications Prior to Admission:    Prior to Admission medications    Medication Sig Start Date End Date Taking?  Authorizing Provider   aspirin 81 MG tablet Take 1 tablet by mouth daily Resume after 1 week 6/6/19   Reggie Templeton MD   pantoprazole (PROTONIX) 40 MG tablet Take 1 tablet by mouth 2 times daily 5/30/19   Reggie Templeton MD   levothyroxine (SYNTHROID) 25 MCG tablet Take 25 mcg by mouth Daily    Historical Provider, MD Multiple Vitamins-Minerals (THERAPEUTIC MULTIVITAMIN-MINERALS) tablet Take 1 tablet by mouth daily    Historical Provider, MD   potassium chloride (MICRO-K) 10 MEQ extended release capsule Take 10 mEq by mouth daily    Historical Provider, MD   vitamin B-12 (CYANOCOBALAMIN) 100 MCG tablet Take 200 mcg by mouth daily    Historical Provider, MD   carvedilol (COREG) 6.25 MG tablet Take 6.25 mg by mouth 2 times daily    Historical Provider, MD   diphenhydrAMINE (BENADRYL ALLERGY) 25 MG tablet Take 25 mg by mouth every 4 hours as needed for Allergies    Historical Provider, MD   docusate sodium (COLACE) 100 MG capsule Take 100 mg by mouth 2 times daily as needed for Constipation    Historical Provider, MD   ipratropium-albuterol (DUONEB) 0.5-2.5 (3) MG/3ML SOLN nebulizer solution Inhale 1 vial into the lungs every 4 hours as needed for Shortness of Breath    Historical Provider, MD   lidocaine (XYLOCAINE) 5 % ointment Apply topically as needed for Pain Apply topically as needed. To lower back    Historical Provider, MD   nitroGLYCERIN (NITROSTAT) 0.4 MG SL tablet Place 0.4 mg under the tongue every 5 minutes as needed for Chest pain up to max of 3 total doses. If no relief after 1 dose, call 911. Historical Provider, MD   acetaminophen (TYLENOL) 325 MG tablet Take 650 mg by mouth every 4 hours as needed for Pain or Fever    Historical Provider, MD   furosemide (LASIX) 40 MG tablet 40 mg 2 times daily  8/25/15   Historical Provider, MD   isosorbide dinitrate (ISORDIL) 10 MG tablet 10 mg 2 times daily  9/16/15   Historical Provider, MD   simvastatin (ZOCOR) 40 MG tablet 40 mg nightly  9/16/15   Historical Provider, MD       Allergies:  Demerol hcl [meperidine]; Zyvox [linezolid]; Penicillins; Sulfur; and Vicodin [hydrocodone-acetaminophen]    Social History:    TOBACCO:   reports that she has never smoked. She has never used smokeless tobacco.  ETOH:   reports that she drank alcohol.     Family History:  Reviewed in detail and negative for DM, Early CAD, Cancer (except as below). Positive as follows:        Problem Relation Age of Onset    Heart Attack Father     Stroke Brother        REVIEW OF SYSTEMS:   Unable to obtain 2/2 pt dementia  PHYSICAL EXAM PERFORMED:    /71   Pulse 70   Temp 97.1 °F (36.2 °C) (Oral)   Resp 17   Ht 5' 8\" (1.727 m)   Wt 196 lb 3.4 oz (89 kg)   SpO2 96%   BMI 29.83 kg/m²      GEN:  Somnolent, opens eyes to name, NAD. Does not follow commands. She appears pale. HEENT:  NC/AT,EOMI, semi dry MM, no erythema/exudates or visible masses. CVS:  Normal S1,S2. RRR. Without M/G/R.   LUNG:   CTA-B. no wheezes, rales or rhonchi  ABD:  Soft, ND/NT, BS+ x4. Without G/R.  EXT: 2+ pulses, no c/c/e. Brisk cap refill. PSY:  Thought process confused, affect pleasantly confused. JOSE ANTONIO:  Grossly:  moves all 4 spontaneously, sensory grossly intact. SKIN: No rash or lesions on visible skin. CBC:  Recent Labs     05/29/19  0525  05/29/19  2352 05/30/19  0607 05/30/19  1256   WBC 8.7  --   --  8.5  --    HGB 7.3*   < > 7.8* 7.3* 8.1*   HCT 22.4*  --   --  22.2*  --      --   --  172  --    HGB at [facility] 6.2    RENAL  Recent Labs     05/29/19  0525 05/30/19  0607   * 142   K 4.6 4.0    103   CO2 31 30   BUN 32* 32*   CREATININE 1.2 0.9   GLUCOSE 120* 120*     LFT'S:  Recent Labs     06/01/19  0403   AST 17   ALT 7*   BILITOT 0.4   ALKPHOS 74     COAG:  Recent Labs     06/01/19  0403   INR 1.00     U/A:  ordered    PHYSICIAN CERTIFICATION    I certify that Olamide Evans is expected to be hospitalized for 2 midnights based on the following assessment and plan:    ASSESSMENT/PLAN:  GI Bleed, recurrent  Acute blood loss anemia   - Hb 6.2 at [facility] today, 8.1 at ID yesterday.   Received 1 unit pRBC yd AM.    - GI c/s, d/w Gi on call, plan for repeat EGD joslyn and possible colonoscopy on the following day per Dr. Glenna Munoz  - EGD on 5/29 showed gastric erosions, DC yesterday w/ plan for   - IV PPI  - monitor h/h and plan to transfuse for hb<7, <8 if large blood seen or large drop in HGB > 1.0 g in 6 h  - IVF for now, may need stopped 2/2 significant hx CHF     S/p gastric banding     Nonischemic CMP  Chronic systolic CHF  - EF 89% in 2015  - home BB and ACEi on hold with low BP  - lasix on hold  - monitor fluid status, IVF for now.     PAF  - clinically in SR  - ASA on hold      CKD3  - Cr stable     Hypothyroidism  - home dose of synthroid 25 mcg     Dementia  - supportive care     DVT Prophylaxis: SCD  Diet: NPO w/ ice chips  Code Status: DNR-CCA  PT/OT Eval Status: Will order if needed and as patient condition allows  Dispo - Admit to inpatient PCU    Bryan Gilford, MD    Thank you Dung Van MD for the opportunity to be involved in this patient's care. If you have any questions or concerns please feel free to contact me via the [employer] at [phone]. This chart was generated using the [address] [place], [place] [address]  dictation system. I created this record but it may contain dictation errors given the limitations of this technology.

## 2019-06-01 NOTE — PLAN OF CARE
Problem: Falls - Risk of:  Goal: Will remain free from falls  Description  Will remain free from falls  Outcome: Ongoing  Goal: Absence of physical injury  Description  Absence of physical injury  Outcome: Ongoing     Problem: Risk for Impaired Skin Integrity  Goal: Tissue integrity - skin and mucous membranes  Description  Structural intactness and normal physiological function of skin and  mucous membranes. Outcome: Ongoing     Problem: OXYGENATION/RESPIRATORY FUNCTION  Goal: Patient will maintain patent airway  Outcome: Ongoing  Goal: Patient will achieve/maintain normal respiratory rate/effort  Description  Respiratory rate and effort will be within normal limits for the patient  Outcome: Ongoing     Problem: HEMODYNAMIC STATUS  Goal: Patient has stable vital signs and fluid balance  Outcome: Ongoing     Problem: FLUID AND ELECTROLYTE IMBALANCE  Goal: Fluid and electrolyte balance are achieved/maintained  Outcome: Ongoing     Problem: ACTIVITY INTOLERANCE/IMPAIRED MOBILITY  Goal: Mobility/activity is maintained at optimum level for patient  Outcome: Ongoing     Problem: Mental Status - Impaired:  Goal: Mental status will improve  Description  Mental status will improve  Outcome: Ongoing     Problem:  Activity:  Goal: Mobility will improve  Description  Mobility will improve  Outcome: Ongoing     Problem: Health Behavior:  Goal: Identification of resources available to assist in meeting health care needs will improve  Description  Identification of resources available to assist in meeting health care needs will improve  Outcome: Ongoing     Problem: Nutritional:  Goal: Ability to identify appropriate dietary choices will improve  Description  Ability to identify appropriate dietary choices will improve  Outcome: Ongoing  Goal: Dietary intake will improve  Description  Dietary intake will improve  Outcome: Ongoing     Problem: Physical Regulation:  Goal: Complications related to the disease process, condition or treatment will be avoided or minimized  Description  Complications related to the disease process, condition or treatment will be avoided or minimized  Outcome: Ongoing     Problem: Safety:  Goal: Ability to remain free from injury will improve  Description  Ability to remain free from injury will improve  Outcome: Ongoing  Goal: Ability to correctly utilize injury-preventing devices as appropriate will improve  Description  Ability to correctly utilize injury-preventing devices as appropriate will improve  Outcome: Ongoing     Problem: Skin Integrity:  Goal: Risk for impaired skin integrity will decrease  Description  Risk for impaired skin integrity will decrease  Outcome: Ongoing     Free from any injury this shift. Care ongoing to stabilize.

## 2019-06-01 NOTE — PROGRESS NOTES
Pt resting quietly. Easily arousals to verbal stimuli.   Respirations easy and regular  Report called to [employer]

## 2019-06-01 NOTE — PROGRESS NOTES
Spoke with Annette from [facility] to obtain pt history and baseline health info. She states pt wears 2L NC at all times, is frequently confused and difficult to reorient, and is a 2-assist for transfers/ambulation.

## 2019-06-01 NOTE — PROGRESS NOTES
18 in [address] infiltrated with blood infusing. Big hematoma noted and blood covered gown and L arm. Blood infusing stopped. Clinical at bedside to place 20 in RFA. Blood continued at 100/hr. Pt denies any pain at this time. Pt cleaned, gown changed. Day shift RN updated. Call light in reach. Bed check in place.  Zachary Rho

## 2019-06-01 NOTE — PROGRESS NOTES
Pt labs resulted, HgB now 7.0, spoke with Dr. Jayme Zavala at this time, new order received to transfuse 1u PRBC, see  for more detail. Updated lab at this time.

## 2019-06-01 NOTE — PROGRESS NOTES
Patient's EF (Ejection Fraction) is less than 40%    Patient's weights and intake/output reviewed:    Patient's Last Weight: 196 lbs obtained by bed scale. Today's weight is noted to be 3 more than last documented weight. No intake or output data in the 24 hours ending 06/01/19 0640    Patient's current functional capacity:  Unable to carry on any physical activity without discomfort. Symptoms of heart failure at rest.    Pt resting in bed at this time on 2 L O2. Pt denies shortness of breath. Pt without lower extremity edema. Patient and family's stated goal of care: reduce shortness of breath, increase activity tolerance, better understand heart failure and disease management, be more comfortable and reduce lower extremity edema prior to discharge        Patient has a past medical history of A-fib ([address] Brain bleed ([address] CAD (coronary artery disease), Cerebral artery occlusion with cerebral infarction ([address] CHF (congestive heart failure) ([address] CKD (chronic kidney disease), COPD (chronic obstructive pulmonary disease) ([address] Dementia, GERD (gastroesophageal reflux disease), History of blood transfusion, Hyperlipidemia, Hypertension, Pacemaker, PVD (peripheral vascular disease) ([address] Thyroid disease, and UTI (urinary tract infection). Comorbidities reviewed and education provided.     >>For CHF and Comorbidity documentation on Education Time and Topics, please see Education Tab

## 2019-06-01 NOTE — PROGRESS NOTES
RESPIRATORY THERAPY ASSESSMENT    Name:  Kinjal [facility] Record Number:  [id number]  Age: [de-identified] y.o. Gender: female  : 1940  Today's Date:  2019  Room:  /0315-01    Assessment     Is the patient being admitted for a COPD or Asthma exacerbation? No   (If yes the patient will be seen every 4 hours for the first 24 hours and then reassessed)    Patient Admission Diagnosis      Allergies  Allergies   Allergen Reactions    Demerol Hcl [Meperidine] Anxiety    Zyvox [Linezolid]     Penicillins Nausea And Vomiting    Sulfur Nausea And Vomiting    Vicodin [Hydrocodone-Acetaminophen] Nausea And Vomiting       Minimum Predicted Vital Capacity:     952          Actual Vital Capacity:      UTP              Pulmonary History:COPD  Home Oxygen Therapy:  2L NC. Home Respiratory Therapy: Duoneb [id number]. Current Respiratory Therapy:  Duoneb [id number]. Respiratory Severity Index(RSI)   Patients with orders for inhalation medications, oxygen, or any therapeutic treatment modality will be placed on Respiratory Protocol. They will be assessed with the first treatment and at least every 72 hours thereafter. The following severity scale will be used to determine frequency of treatment intervention.     Smoking History: Pulmonary Disease or Smoking History, Greater than 15 pack year = 2    Social History  Social History     Tobacco Use    Smoking status: Never Smoker    Smokeless tobacco: Never Used   Substance Use Topics    Alcohol use: Not Currently     Alcohol/week: 0.0 oz    Drug use: Not Currently       Recent Surgical History: None = 0  Past Surgical History  Past Surgical History:   Procedure Laterality Date    CHOLECYSTECTOMY      HYSTERECTOMY      LAP BAND      UPPER GASTROINTESTINAL ENDOSCOPY  2019    Gastric Erosions    UPPER GASTROINTESTINAL ENDOSCOPY N/A 2019    EGD BIOPSY performed by Venkatesh Holland DO at [address] [place] [address] Right 10/29/15 ORIF right wrist with allograft bone grafting       Level of Consciousness: Lethargic = 3    Level of Activity: Bedridden, unresponsive or quadriplegic = 4    Respiratory Pattern: Regular Pattern; RR 8-20 = 0    Breath Sounds: Diminshed bilaterally and/or crackles = 2    Sputum   ,  ,    Cough: Strong, spontaneous, non-productive = 0    Vital Signs   /71   Pulse 70   Temp 97.1 °F (36.2 °C) (Oral)   Resp 17   Ht 5' 8\" (1.727 m)   Wt 196 lb 3.4 oz (89 kg)   SpO2 96%   BMI 29.83 kg/m²   SPO2 (COPD values may differ): 90-91% on room air or greater than 92% on FiO2 24- 28% = 1    Peak Flow (asthma only): not applicable = 0    RSI: 35-98 = Q6H or QID and Q4HPRN for dyspnea        Plan       Goals: medication delivery    Patient/caregiver was educated on the proper method of use for Respiratory Care Devices:        Level of patient/caregiver understanding able to:   ? Verbalize understanding   ? Demonstrate understanding       ? Teach back        ? Needs reinforcement       ? No available caregiver               ? Other:     Response to education:       Is patient being placed on Home Treatment Regimen? No     Does the patient have everything they need prior to discharge? NA     Comments: Chart reviewed and patient assessed. Plan of Care: Duoneb QID. Electronically signed by Riccardo Wallace RCP on 6/1/2019 at 3:58 AM    Respiratory Protocol Guidelines     1. Assessment and treatment by Respiratory Therapy will be initiated for medication and therapeutic interventions upon initiation of aerosolized medication. 2. Physician will be contacted for respiratory rate (RR) greater than 35 breaths per minute. Therapy will be held for heart rate (HR) greater than 140 beats per minute, pending direction from physician. 3. Bronchodilators will be administered via Metered Dose Inhaler (MDI) with spacer when the following criteria are met:  a.  Alert and cooperative     b. HR < 140 bpm  c. RR < 30 bpm d. Can demonstrate a 2-3 second inspiratory hold  4. Bronchodilators will be administered via Hand Held Nebulizer [facility] to patients when ANY of the following criteria are met  a. Incognizant or uncooperative          b. Patients treated with HHN at Home        c. Unable to demonstrate proper use of MDI with spacer     d. RR > 30 bpm   5. Bronchodilators will be delivered via Metered Dose Inhaler (MDI), HHN, Aerogen to intubated patients on mechanical ventilation. 6. Inhalation medication orders will be delivered and/or substituted as outlined below. Aerosolized Medications Ordering and Administration Guidelines:    1. All Medications will be ordered by a physician, and their frequency and/or modality will be adjusted as defined by the patients Respiratory Severity Index (RSI) score. 2. If the patient does not have documented COPD, consider discontinuing anticholinergics when RSI is less than 9.  3. If the bronchospasm worsens (increased RSI), then the bronchodilator frequency can be increased to a maximum of every 4 hours. If greater than every 4 hours is required, the physician will be contacted. 4. If the bronchospasm improves, the frequency of the bronchodilator can be decreased, based on the patient's RSI, but not less than home treatment regimen frequency. 5. Bronchodilator(s) will be discontinued if patient has a RSI less than 9 and has received no scheduled or as needed treatment for 72  Hrs. Patients Ordered on a Mucolytic Agent:    1. Must always be administered with a bronchodilator. 2. Discontinue if patient experiences worsened bronchospasm, or secretions have lessened to the point that the patient is able to clear them with a cough. Anti-inflammatory and Combination Medications:    1.  If the patient lacks prior history of lung disease, is not using inhaled anti-inflammatory medication at home, and lacks wheezing by examination or by history for at least 24 hours, contact physician for possible discontinuation.

## 2019-06-01 NOTE — H&P
Pre-sedation Assessment    History and Physical / Pre-Sedation Assessment  Patient:  Naheed Ramos   :   1940     Intended Procedure: EGD      HPI: Melena/hematochezia and anemia    Past Medical History:   Diagnosis Date    [facility]fib [facility]     Brain bleed ([facility]     CAD (coronary artery disease)     Cerebral artery occlusion with cerebral infarction ([facility]     CHF (congestive heart failure) (HCC)     CKD (chronic kidney disease)     COPD (chronic obstructive pulmonary disease) (HCC)     Dementia     GERD (gastroesophageal reflux disease)     History of blood transfusion     Hyperlipidemia     Hypertension     Pacemaker     [facility]ipheral [facility]     Thyroid disease     UTI (urinary tract infection)          Procedure Laterality Date    CHOLECYSTECTOMY      HYSTERECTOMY      LAP BAND      UPPER GASTROINTESTINAL ENDOSCOPY  2019    Gastric Erosions    UPPER GASTROINTESTINAL ENDOSCOPY N/A 2019    EGD BIOPSY performed by Lobo Bro DO [address] Right 10/29/15    ORIF right wrist with allograft bone grafting     Current Facility-Administered Medications   Medication Dose Route Frequency Provider Last Rate Last Dose    carvedilol (COREG) tablet 6.25 mg  6.25 mg Oral BID  Hipolito Ortiz MD        diphenhydrAMINE (BENADRYL) tablet 25 mg  25 mg Oral Q4H PRN Hipolito Ortiz MD        docusate sodium (COLACE) capsule 100 mg  100 mg Oral BID PRN Hipolito Ortiz MD        isosorbide dinitrate (ISORDIL) tablet 10 mg  10 mg Oral BID Hipolito Ortiz MD        levothyroxine (SYNTHROID) tablet 25 mcg  25 mcg Oral Daily Hipolito Ortiz MD        nitroGLYCERIN (NITROSTAT) SL tablet 0.4 mg  0.4 mg Sublingual Q5 Min PRN Hipolito Ortiz MD        simvastatin (ZOCOR) tablet 40 mg  40 mg Oral Nightly Hipolito Ortiz MD        vitamin B-12 (CYANOCOBALAMIN) tablet 200 mcg  200 mcg Oral Daily Hipolito Ortiz MD        0.9 % [phone]        [facility]  9:32 AM 6/1/2019

## 2019-06-01 NOTE — CARE COORDINATION
Case Management Assessment  Initial Evaluation    Date/Time of Evaluation: 6/1/2019 10:42 AM  Assessment Completed by: Alexi Sims    Patient Name: Keyla Hay  YOB: 1940  Diagnosis: GI bleed [K92.2]  Date / Time: 6/1/2019  3:07 AM  Admission status/Date: 5/31-19 inpt 20:30  Chart Reviewed: Yes      Patient Interviewed:  No   Family Interviewed:  Yes - Son Samson gupta     Hospitalization in the last 30 days:  Yes    Contacts  :   Erma Del Cid  Relationship to Patient: Son  Phone Number:  [phone]  Alternate Contact:     Relationship to Patient:     Phone Number:    Met with:     Current PCP Adry5 Joey Johnson  Medicare  Precert required for SNF :  N        3 night stay required - Y    ADLS  Support Systems: Children, Family Members  Transportation: EMS transportation    Meal Preparation: per facility    Housing  Home Environment: LTC ACM  Steps:   Plans to Return to Present Housing: Yes  Other Identified Issues:         Has Home O2 in place on admit:  Per facility  Informed of need to bring portable home O2 tank on day of discharge for nursing to connect prior to leaving:   No  Verbalized agreement/Understanding:   Not Indicated      DISCHARGE PLAN:  Reviewed chart and attempted to meet with pt who is out for testing. Spoke with Son via phone who verifies that pt is LTC at [facility] and will return at [place]. Spoke with Kathya at [facility] who also state pt is LTC and can retrun at [facility]. Will cont to follow  Explained Case Management role/services.

## 2019-06-01 NOTE — CONSULTS
chloride infusion   Intravenous Continuous Jani Marlow  mL/hr at 06/01/19 0807 1,000 mL at 06/01/19 0807    sodium chloride flush 0.9 % injection 10 mL  10 mL Intravenous 2 times per day Jani Marlow MD        sodium chloride flush 0.9 % injection 10 mL  10 mL Intravenous PRN Jani Marlow MD        acetaminophen (TYLENOL) tablet 650 mg  650 mg Oral Q4H PRN Jani Marlow MD        ondansetron [facility]) injection 4 mg  4 mg Intravenous Q6H PRN Jani Marlow MD        morphine sulfate (PF) injection 2 mg  2 mg Intravenous Q3H PRN Jani Marlow MD        esomeprazole (NEXIUM) injection 40 mg  40 mg Intravenous BID Jani Marlow MD        ipratropium-albuterol (DUONEB) nebulizer solution 3 mL  1 vial Inhalation 4x daily Ricardo Orellana MD           Nurses notes reviewed and agreed. Medications reviewed  Allergies: Allergies   Allergen Reactions    Demerol Hcl [Meperidine] Anxiety    Zyvox [Linezolid]     Penicillins Nausea And Vomiting    Sulfur Nausea And Vomiting    Vicodin [Hydrocodone-Acetaminophen] Nausea And Vomiting           Physical Exam:  Vital Signs: BP (!) 127/57   Pulse 71   Temp 97.1 °F (36.2 °C) (Temporal)   Resp 16   Ht 5' 8\" (1.727 m)   Wt 196 lb 3.4 oz (89 kg)   SpO2 100%   BMI 29.83 kg/m²  Body mass index is 29.83 kg/m². Airway:Normal  Cardiac:Normal  Pulmonary:Normal  Abdomen:Normal  Specific to procedure: none      Pre-Procedure Assessment/Plan:  ASA 3 - Patient with moderate systemic disease with functional limitations  Malampati I  Level of Sedation Plan:Mild sedation    Post Procedure plan: Return to same level of care    I assessed the patient and find that the patient is in satisfactory condition to proceed with the planned procedure and sedation plan. I have explained the risk, benefits, and alternatives to the procedure. The patient/POA understands and agrees to proceed.   Yes    Rm Car MD       (O) [phone]        Crickte Looney  9:32 AM 6/1/2019

## 2019-06-02 LAB
BACTERIA: ABNORMAL /HPF
BILIRUBIN URINE: NEGATIVE
BLOOD, URINE: ABNORMAL
CLARITY: ABNORMAL
COLOR: YELLOW
GLUCOSE URINE: NEGATIVE MG/DL
HEMOGLOBIN: 10.2 G/DL (ref 12–16)
HEMOGLOBIN: 9.1 G/DL (ref 12–16)
HEMOGLOBIN: 9.2 G/DL (ref 12–16)
KETONES, URINE: NEGATIVE MG/DL
LEUKOCYTE ESTERASE, URINE: ABNORMAL
MICROSCOPIC EXAMINATION: YES
NITRITE, URINE: NEGATIVE
PH UA: 6 (ref 5–8)
PROTEIN UA: 30 MG/DL
RBC UA: ABNORMAL /HPF (ref 0–2)
SPECIFIC GRAVITY UA: 1.02 (ref 1–1.03)
URINE REFLEX TO CULTURE: YES
URINE TYPE: ABNORMAL
UROBILINOGEN, URINE: 1 E.U./DL
WBC UA: ABNORMAL /HPF (ref 0–5)

## 2019-06-02 PROCEDURE — 99232 SBSQ HOSP IP/OBS MODERATE 35: CPT | Performed by: INTERNAL MEDICINE

## 2019-06-02 PROCEDURE — 2500000003 HC RX 250 WO HCPCS: Performed by: INTERNAL MEDICINE

## 2019-06-02 PROCEDURE — 0DJ08ZZ INSPECTION OF UPPER INTESTINAL TRACT, VIA NATURAL OR ARTIFICIAL OPENING ENDOSCOPIC: ICD-10-PCS | Performed by: INTERNAL MEDICINE

## 2019-06-02 PROCEDURE — 99152 MOD SED SAME PHYS/QHP 5/>YRS: CPT | Performed by: INTERNAL MEDICINE

## 2019-06-02 PROCEDURE — 6370000000 HC RX 637 (ALT 250 FOR IP): Performed by: INTERNAL MEDICINE

## 2019-06-02 PROCEDURE — 51701 INSERT BLADDER CATHETER: CPT

## 2019-06-02 PROCEDURE — 2060000000 HC ICU INTERMEDIATE R&B

## 2019-06-02 PROCEDURE — 3609009500 HC COLONOSCOPY DIAGNOSTIC OR SCREENING: Performed by: INTERNAL MEDICINE

## 2019-06-02 PROCEDURE — 6360000002 HC RX W HCPCS: Performed by: INTERNAL MEDICINE

## 2019-06-02 PROCEDURE — 81001 URINALYSIS AUTO W/SCOPE: CPT

## 2019-06-02 PROCEDURE — 2580000003 HC RX 258: Performed by: INTERNAL MEDICINE

## 2019-06-02 PROCEDURE — 7100000010 HC PHASE II RECOVERY - FIRST 15 MIN: Performed by: INTERNAL MEDICINE

## 2019-06-02 PROCEDURE — 99153 MOD SED SAME PHYS/QHP EA: CPT | Performed by: INTERNAL MEDICINE

## 2019-06-02 PROCEDURE — 7100000011 HC PHASE II RECOVERY - ADDTL 15 MIN: Performed by: INTERNAL MEDICINE

## 2019-06-02 PROCEDURE — 94761 N-INVAS EAR/PLS OXIMETRY MLT: CPT

## 2019-06-02 PROCEDURE — 36415 COLL VENOUS BLD VENIPUNCTURE: CPT

## 2019-06-02 PROCEDURE — 0DJD8ZZ INSPECTION OF LOWER INTESTINAL TRACT, VIA NATURAL OR ARTIFICIAL OPENING ENDOSCOPIC: ICD-10-PCS | Performed by: INTERNAL MEDICINE

## 2019-06-02 PROCEDURE — 85018 HEMOGLOBIN: CPT

## 2019-06-02 PROCEDURE — 2700000000 HC OXYGEN THERAPY PER DAY

## 2019-06-02 PROCEDURE — 94640 AIRWAY INHALATION TREATMENT: CPT

## 2019-06-02 PROCEDURE — 2709999900 HC NON-CHARGEABLE SUPPLY: Performed by: INTERNAL MEDICINE

## 2019-06-02 RX ORDER — FENTANYL CITRATE 50 UG/ML
INJECTION, SOLUTION INTRAMUSCULAR; INTRAVENOUS PRN
Status: DISCONTINUED | OUTPATIENT
Start: 2019-06-02 | End: 2019-06-02 | Stop reason: ALTCHOICE

## 2019-06-02 RX ORDER — MIDAZOLAM HYDROCHLORIDE 5 MG/ML
INJECTION INTRAMUSCULAR; INTRAVENOUS PRN
Status: DISCONTINUED | OUTPATIENT
Start: 2019-06-02 | End: 2019-06-02 | Stop reason: ALTCHOICE

## 2019-06-02 RX ADMIN — CARVEDILOL 6.25 MG: 6.25 TABLET, FILM COATED ORAL at 09:11

## 2019-06-02 RX ADMIN — ESOMEPRAZOLE SODIUM 40 MG: 40 INJECTION, POWDER, LYOPHILIZED, FOR SOLUTION INTRAVENOUS at 21:48

## 2019-06-02 RX ADMIN — ISOSORBIDE DINITRATE 10 MG: 10 TABLET ORAL at 09:12

## 2019-06-02 RX ADMIN — Medication 200 MCG: at 09:11

## 2019-06-02 RX ADMIN — LEVOTHYROXINE SODIUM 25 MCG: 25 TABLET ORAL at 09:12

## 2019-06-02 RX ADMIN — ISOSORBIDE DINITRATE 10 MG: 10 TABLET ORAL at 16:34

## 2019-06-02 RX ADMIN — CEFTRIAXONE SODIUM 1 G: 1 INJECTION, POWDER, FOR SOLUTION INTRAMUSCULAR; INTRAVENOUS at 18:57

## 2019-06-02 RX ADMIN — ESOMEPRAZOLE SODIUM 40 MG: 40 INJECTION, POWDER, LYOPHILIZED, FOR SOLUTION INTRAVENOUS at 09:11

## 2019-06-02 RX ADMIN — CARVEDILOL 6.25 MG: 6.25 TABLET, FILM COATED ORAL at 16:34

## 2019-06-02 RX ADMIN — SIMVASTATIN 40 MG: 40 TABLET, FILM COATED ORAL at 21:48

## 2019-06-02 RX ADMIN — IPRATROPIUM BROMIDE AND ALBUTEROL SULFATE 3 ML: .5; 3 SOLUTION RESPIRATORY (INHALATION) at 07:21

## 2019-06-02 RX ADMIN — IPRATROPIUM BROMIDE AND ALBUTEROL SULFATE 3 ML: .5; 3 SOLUTION RESPIRATORY (INHALATION) at 19:07

## 2019-06-02 RX ADMIN — Medication 10 ML: at 21:48

## 2019-06-02 RX ADMIN — IPRATROPIUM BROMIDE AND ALBUTEROL SULFATE 3 ML: .5; 3 SOLUTION RESPIRATORY (INHALATION) at 15:20

## 2019-06-02 RX ADMIN — Medication 10 ML: at 09:12

## 2019-06-02 ASSESSMENT — PAIN SCALES - GENERAL
PAINLEVEL_OUTOF10: 0

## 2019-06-02 ASSESSMENT — PAIN - FUNCTIONAL ASSESSMENT: PAIN_FUNCTIONAL_ASSESSMENT: 0-10

## 2019-06-02 NOTE — PROGRESS NOTES
Pt resting quietly. Skin warm and dry. Respirations easy and regular. Family went to pts room after talking to Dr Ching Ridley.

## 2019-06-02 NOTE — PROGRESS NOTES
Progress Note    Admit Date:  6/1/2019    Subjective:  Ms. Marietta Duong had some rectal bleeding. Colonoscopy today. She had a low grade fever. No other complaints. Objective:   Patient Vitals for the past 4 hrs:   BP Temp Temp src Pulse Resp SpO2   06/02/19 0947 130/74 99.8 °F (37.7 °C) Temporal 70 16 95 %   06/02/19 0815 137/87 97 °F (36.1 °C) Oral 69 18 98 %            Intake/Output Summary (Last 24 hours) at 6/2/2019 0956  Last data filed at 6/2/2019 0949  Gross per 24 hour   Intake 2600 ml   Output --   Net 2600 ml       Physical Exam:    General appearance: alert, appears stated age and cooperative  Head: Normocephalic, without obvious abnormality, atraumatic  Eyes: conjunctivae/corneas clear. PERRL, EOM's intact.   Neck: no adenopathy, no carotid bruit, no JVD, supple, symmetrical, trachea midline and thyroid not enlarged, symmetric, no tenderness/mass/nodules  Lungs: clear to auscultation bilaterally  Heart: regular rate and rhythm, S1, S2 normal, no murmur, click, rub or gallop  Abdomen: soft, non-tender; bowel sounds normal; no masses,  no organomegaly  Extremities: extremities normal, atraumatic, no cyanosis or edema  Pulses: 2+ and symmetric  Skin: Skin color, texture, turgor normal. No rashes or lesions  Neurologic: Grossly normal    Scheduled Meds:   carvedilol  6.25 mg Oral BID WC    isosorbide dinitrate  10 mg Oral BID    levothyroxine  25 mcg Oral Daily    simvastatin  40 mg Oral Nightly    vitamin B-12  200 mcg Oral Daily    sodium chloride flush  10 mL Intravenous 2 times per day    esomeprazole  40 mg Intravenous BID    ipratropium-albuterol  1 vial Inhalation 4x daily       Continuous Infusions:      PRN Meds:  fentaNYL, midazolam, diphenhydrAMINE, docusate sodium, nitroGLYCERIN, sodium chloride flush, acetaminophen, ondansetron, morphine      Data:  CBC:   Recent Labs     06/01/19  0403 06/01/19  1301 06/01/19  2210 06/02/19  0601   WBC 8.4  --   --   --    HGB 7.0* 7.4* 9.1* 9.2*   HCT 21.0*  --   --   --    MCV 95.3  --   --   --      --   --   --      BMP:   Recent Labs     06/01/19  0403      K 3.7      CO2 31   BUN 25*   CREATININE 0.9     LIVER PROFILE:   Recent Labs     06/01/19  0403   AST 17   ALT 7*   BILITOT 0.4   ALKPHOS 74     PT/INR:   Recent Labs     06/01/19  0403   PROTIME 11.4   INR 1.00           Assessment:  Principal Problem:    GI bleed  Active Problems:    Acute blood loss anemia    Dementia    Acquired hypothyroidism    CAD (coronary artery disease)    A-fib (HCC)  Resolved Problems:    * No resolved hospital problems. *      Plan:    #  Recurrent GI bleed. Hemoglobin of 6.2 at [facility]. She was 8.1 at discharge 2 days ago. Patient had an EGD on 6/1/19. It showed 3 tiny gastric body ulcers one with erythema and a black spot. Hemoglobin today is 9.2. #  Paroxysmal atrial fibrillation. Hold aspirin due to GI bleed. #  Chronic systolic heart failure/nonischemic cardiomyopathy. EF was 20% on last check. Beta blocker was held because blood pressure is low. Lasix was held. She was getting IV fluids. Her IV fluids are now off. Her blood pressure stable. She is back on Coreg. #  Chronic kidney disease. Stable. #  Continue Synthroid for hypothyroidism. #  Dementia stable. SCD for DVT prophylaxis.     Rosalie Pavon MD 6/2/2019 9:56 AM

## 2019-06-02 NOTE — PROGRESS NOTES
Pt in bed, awake, A/O to self, disoriented X. . Shift assessment complete, night meds given. No C/O pain at this time. Changed and repositioned. . No other needs expressed. Call light in reach. Bed check in place. Will monitor.  Akua Forde

## 2019-06-02 NOTE — H&P
(CYANOCOBALAMIN) tablet 200 mcg  200 mcg Oral Daily Ramial Kaur MD        0.9 % sodium chloride infusion   Intravenous Continuous Ramila Kaur  mL/hr at 06/01/19 0807 1,000 mL at 06/01/19 0807    sodium chloride flush 0.9 % injection 10 mL  10 mL Intravenous 2 times per day Ramila Kaur MD        sodium chloride flush 0.9 % injection 10 mL  10 mL Intravenous PRN Ramila Kaur MD        acetaminophen (TYLENOL) tablet 650 mg  650 mg Oral Q4H PRN Ramila Kaur MD        ondansetron [facility]) injection 4 mg  4 mg Intravenous Q6H PRN Ramila Kaur MD        morphine sulfate (PF) injection 2 mg  2 mg Intravenous Q3H PRN Ramila Kaur MD        esomeprazole (NEXIUM) injection 40 mg  40 mg Intravenous BID Ramila Kaur MD        ipratropium-albuterol (DUONEB) nebulizer solution 3 mL  1 vial              Nurses notes reviewed and agreed. Medications reviewed  Allergies: Allergies   Allergen Reactions    Demerol Hcl [Meperidine] Anxiety    Zyvox [Linezolid]     Penicillins Nausea And Vomiting    Sulfur Nausea And Vomiting    Vicodin [Hydrocodone-Acetaminophen] Nausea And Vomiting           Physical Exam:  Vital Signs: /74   Pulse 70   Temp 99.8 °F (37.7 °C) (Temporal)   Resp 16   Ht 5' 8\" (1.727 m)   Wt 195 lb 3.2 oz (88.5 kg)   SpO2 95%   BMI 29.68 kg/m²  Body mass index is 29.68 kg/m². Airway:Normal  Cardiac:Normal  Pulmonary:Normal  Abdomen:Normal  Specific to procedure: none      Pre-Procedure Assessment/Plan:  ASA 3 - Patient with moderate systemic disease with functional limitations  Malampati I  Level of Sedation Plan: Moderate sedation    Post Procedure plan: Return to same level of care    I assessed the patient and find that the patient is in satisfactory condition to proceed with the planned procedure and sedation plan. I have explained the risk, benefits, and alternatives to the procedure.  The patient's family understands and agrees to proceed.   Yes    Cheihk Estrada MD       (O) [phone]        Cheikh Estrada  9:55 AM 6/2/2019

## 2019-06-02 NOTE — PROGRESS NOTES
Blood infusion complete. VSS. No S/S reaction noted. Pt total bed cleaned. Large loose BM.  Will monitor Bright Carey

## 2019-06-02 NOTE — PROGRESS NOTES
Spoke with Dr. Herminia Mohan and updated on dark red/ black loose stool. No formed stool seen.  NNO at this time Mckenzie Nickerson

## 2019-06-03 VITALS
HEART RATE: 76 BPM | DIASTOLIC BLOOD PRESSURE: 71 MMHG | TEMPERATURE: 99 F | BODY MASS INDEX: 29.4 KG/M2 | HEIGHT: 68 IN | OXYGEN SATURATION: 98 % | WEIGHT: 194 LBS | SYSTOLIC BLOOD PRESSURE: 147 MMHG | RESPIRATION RATE: 16 BRPM

## 2019-06-03 LAB
HEMOGLOBIN: 9.3 G/DL (ref 12–16)
HEMOGLOBIN: 9.6 G/DL (ref 12–16)
HEMOGLOBIN: 9.6 G/DL (ref 12–16)

## 2019-06-03 PROCEDURE — 85018 HEMOGLOBIN: CPT

## 2019-06-03 PROCEDURE — 2500000003 HC RX 250 WO HCPCS: Performed by: INTERNAL MEDICINE

## 2019-06-03 PROCEDURE — 6370000000 HC RX 637 (ALT 250 FOR IP): Performed by: INTERNAL MEDICINE

## 2019-06-03 PROCEDURE — 36415 COLL VENOUS BLD VENIPUNCTURE: CPT

## 2019-06-03 PROCEDURE — 94640 AIRWAY INHALATION TREATMENT: CPT

## 2019-06-03 PROCEDURE — 99239 HOSP IP/OBS DSCHRG MGMT >30: CPT | Performed by: INTERNAL MEDICINE

## 2019-06-03 PROCEDURE — 2580000003 HC RX 258: Performed by: INTERNAL MEDICINE

## 2019-06-03 RX ORDER — CEPHALEXIN 250 MG/1
250 CAPSULE ORAL 3 TIMES DAILY
Qty: 21 CAPSULE | Refills: 0
Start: 2019-06-03 | End: 2019-06-10

## 2019-06-03 RX ADMIN — IPRATROPIUM BROMIDE AND ALBUTEROL SULFATE 3 ML: .5; 3 SOLUTION RESPIRATORY (INHALATION) at 07:59

## 2019-06-03 RX ADMIN — CARVEDILOL 6.25 MG: 6.25 TABLET, FILM COATED ORAL at 09:07

## 2019-06-03 RX ADMIN — Medication 10 ML: at 09:07

## 2019-06-03 RX ADMIN — ISOSORBIDE DINITRATE 10 MG: 10 TABLET ORAL at 09:07

## 2019-06-03 RX ADMIN — Medication 200 MCG: at 09:07

## 2019-06-03 RX ADMIN — ESOMEPRAZOLE SODIUM 40 MG: 40 INJECTION, POWDER, LYOPHILIZED, FOR SOLUTION INTRAVENOUS at 09:07

## 2019-06-03 RX ADMIN — LEVOTHYROXINE SODIUM 25 MCG: 25 TABLET ORAL at 06:25

## 2019-06-03 RX ADMIN — CARVEDILOL 6.25 MG: 6.25 TABLET, FILM COATED ORAL at 16:25

## 2019-06-03 RX ADMIN — IPRATROPIUM BROMIDE AND ALBUTEROL SULFATE 3 ML: .5; 3 SOLUTION RESPIRATORY (INHALATION) at 15:28

## 2019-06-03 NOTE — FLOWSHEET NOTE
06/02/19 2143   Vital Signs   Temp 97.2 °F (36.2 °C)   Temp Source Oral   Pulse 69   Heart Rate Source Monitor   Resp 18   BP (!) 177/91   Level of Consciousness 0   MEWS Score 1   Oxygen Therapy   SpO2 98 %   O2 Device None (Room air)   Vital signs stable. Pt is alert and oriented X1 and denies any SOB or pain at the moment. Nothing new noted on head to toe assessment. Pt is V-paced on the monitor. Evening medication administration completed. Pt denies any further assistance at the moment. Will continue to monitor.

## 2019-06-03 NOTE — PROGRESS NOTES
Patient w/ slight drooling from left side of mouth. Swallowing without difficulty. MD informed and in to evaluate patient. -  Equal movement extremities X4. VSS.   Will further monitor

## 2019-06-03 NOTE — DISCHARGE SUMMARY
Name:  Beka Trammell  Room:  /0912-45  MRN:    [id number]    Discharge Summary      This discharge summary is in conjunction with a complete physical exam done on the day of discharge. Discharging Physician: Dr. Rd Newman: 6/1/2019  Discharge:   6/3/2019     HPI taken from admission H&P:    The patient is a [de-identified] y.o. female with PMH below, presents with BRBPR/melena. Pt was DC yesterday following admit for GI bleed w/ plan for PO PPI BID for 8 weeks. She underwent EGD on 5/29 which showed gastric erosions. She was last transfused yesterday prior to DC. SNF sent her to [facility] ED for BRBPR\melena and low HGB of 6.2. HGB yd was 8.1 after transfusion. She has hx of dementia and does not know why she is here. She is not able to give helpful hx. She is DNR-CCA. Diagnoses this Admission and Hospital Course        #  Recurrent GI bleed. Hemoglobin of 6.2 at [facility]. She was 8.1 at discharge 2 days ago. Patient had an EGD on 6/1/19. It showed 3 tiny gastric body ulcers one with erythema and a black spot. Hb stable. Continue PPI BID. Colonoscopy without source of bleeding identified      #  Paroxysmal atrial fibrillation. Hold aspirin due to GI bleed, can resume on 6/6/19     #  Chronic systolic heart failure/nonischemic cardiomyopathy. EF was 20% on last check. Beta blocker was held because blood pressure is low. Lasix was held. She received  IV fluids. Her IV fluids are now off. Her blood pressure stable. She is back on Coreg.     #  Chronic kidney disease. Stable.     #  Continue Synthroid for hypothyroidism.     #  Dementia stable. # E.coli UTI- dc on keflex. Procedures (Please Review Full Report for Details)  EGD 6/1/19  Findings[de-identified]   Esophagus: normal. The findings do not support a diagnosis of Barrera's Esophagus.   Stomach: 3 tiny (3 mm) gastric body ulcers (one w erythema and black spot)  Duodenum: normal except diverticulum  Recommendations: -Acid 30.1     UA:  Recent Labs     06/01/19  1630 06/02/19  1630   COLORU  --  Yellow   PHUR  --  6.0   WBCUA 20-50*  --    RBCUA 5-10*  --    BACTERIA 3+*  --    CLARITYU  --  SL CLOUDY*   SPECGRAV  --  1.020   LEUKOCYTESUR  --  LARGE*   UROBILINOGEN  --  1.0   BILIRUBINUR  --  Negative   BLOODU  --  SMALL*   GLUCOSEU  --  Negative     CULTURES  Susceptibility     Escherichia coli (1)     Antibiotic Interpretation ANNALEE Status    ampicillin Sensitive <=4 mcg/mL     ceFAZolin Sensitive 2 mcg/mL      NOTE: Cefazolin should only be used for uncomplicated UTI        for E.coli or Klebsiella pneumoniae. cefepime Sensitive <=1 mcg/mL     cefTRIAXone Sensitive <=1 mcg/mL     ciprofloxacin Sensitive <=0.5 mcg/mL     gentamicin Sensitive <=2 mcg/mL     levofloxacin Sensitive <=1 mcg/mL     meropenem Sensitive <=0.25 mcg/mL     nitrofurantoin Sensitive <=16 mcg/mL     piperacillin-tazobactam Sensitive <=2/4 mcg/mL     tetracycline Sensitive <=2 mcg/mL     trimethoprim-sulfamethoxazole Sensitive <=0.5/9.5 mcg           RADIOLOGY  XR ABDOMEN FOR NG/OG/NE TUBE PLACEMENT   Final Result   Unremarkable limited KUB. NG tube tip projects at the left upper quadrant, overlying the expected   location of the stomach.              Discharge Medications     Medication List      START taking these medications    cephALEXin 250 MG capsule  Commonly known as:  KEFLEX  Take 1 capsule by mouth 3 times daily for 7 days        CONTINUE taking these medications    acetaminophen 325 MG tablet  Commonly known as:  TYLENOL     aspirin 81 MG tablet  Take 1 tablet by mouth daily Resume after 1 week  Start taking on:  6/6/2019     BENADRYL ALLERGY 25 MG tablet  Generic drug:  diphenhydrAMINE     carvedilol 6.25 MG tablet  Commonly known as:  COREG     docusate sodium 100 MG capsule  Commonly known as:  COLACE     DULoxetine 30 MG extended release capsule  Commonly known as:  CYMBALTA     furosemide 40 MG tablet  Commonly known as:  LASIX ipratropium-albuterol 0.5-2.5 (3) MG/3ML Soln nebulizer solution  Commonly known as:  DUONEB     isosorbide dinitrate 10 MG tablet  Commonly known as:  ISORDIL     levothyroxine 25 MCG tablet  Commonly known as:  SYNTHROID     lidocaine 5 % ointment  Commonly known as:  XYLOCAINE     nitroGLYCERIN 0.4 MG SL tablet  Commonly known as:  NITROSTAT     nystatin 367703 UNIT/GM powder  Commonly known as:  MYCOSTATIN     OXYGEN     pantoprazole 40 MG tablet  Commonly known as:  PROTONIX  Take 1 tablet by mouth 2 times daily     potassium chloride 10 MEQ extended release capsule  Commonly known as:  MICRO-K     simvastatin 40 MG tablet  Commonly known as:  ZOCOR     therapeutic multivitamin-minerals tablet     vitamin B-12 100 MCG tablet  Commonly known as:  CYANOCOBALAMIN           Where to Get Your Medications      Information about where to get these medications is not yet available    Ask your nurse or doctor about these medications  · cephALEXin 250 MG capsule           Discharged in stable condition to Home     Follow Up:   Follow up with PCP in 1 week     More than 30 mts riccardo Lynn 6/5/2019 2:33 PM

## 2019-06-03 NOTE — DISCHARGE INSTR - DIET

## 2019-06-03 NOTE — DISCHARGE INSTR - COC
Continuity of Care Form    Patient Name: Ralph Martinez   :  1940  MRN:  [id number]    [facility] date:  2019  Discharge date:  6/3/19    Code Status Order: DNR-CCA   Advance Directives:   [facility] Documentation     Date/Time Healthcare Directive Type of Healthcare Directive Copy in [address] Agent's Name Healthcare Agent's Phone Number    [phone]  Yes, patient has an advance directive for healthcare treatment --  No, copy requested from family  [facility] power of  -- --          Admitting Physician:  Jose Luis Camilo MD  PCP: Robyn Bentley MD    Discharging Nurse: Wicho Santos RN  [address] Unit/Room#: /3135-38  Discharging Unit Phone Number: [phone]    Emergency Contact:   Extended Emergency Contact Information  Primary Emergency Contact: Yenny Turner [address] Phone: [phone]  Relation: Child  Secondary Emergency Contact: Nabil Ya  Mobile Phone: [phone]  Relation: Child    Past Surgical History:  Past Surgical History:   Procedure Laterality Date    CHOLECYSTECTOMY      COLONOSCOPY N/A 2019    COLONOSCOPY DIAGNOSTIC performed by Tameka Chang MD at [address] [place] [address] ENDOSCOPY  2019    Gastric Erosions    UPPER GASTROINTESTINAL ENDOSCOPY N/A 2019    EGD BIOPSY performed by Katiana Sorensen DO at [place] [address] N/A 2019    EGD ESOPHAGOGASTRODUODENOSCOPY performed by Tameka Chang MD at [address] [place] [address] Right 10/29/15    ORIF right wrist with allograft bone grafting       Immunization History:   Immunization History   Administered Date(s) Administered    Influenza Vaccine, unspecified formulation 2012, 10/01/2013, 10/02/2015    Pneumococcal 13-valent Conjugate (Evjehbz99) 2019    Pneumococcal Polysaccharide (Beqlnnopj88) 10/01/2013       Active Problems:  Patient Active Problem List   Diagnosis Code    Closed fracture of radius S52.90XA    GI bleed K92.2    Acute blood loss anemia D62    Dementia F03.90    Acquired hypothyroidism E03.9    CAD (coronary artery disease) I25.10    A-fib (HCC) I48.91       Isolation/Infection:   Isolation          No Isolation            Nurse Assessment:  Last Vital Signs: BP (!) 147/71   Pulse 76   Temp 99 °F (37.2 °C) (Oral)   Resp 16   Ht 5' 8\" (1.727 m)   Wt 194 lb 0.1 oz (88 kg)   SpO2 98%   BMI 29.50 kg/m²     Last documented pain score (0-10 scale): Pain Level: 0  Last Weight:   Wt Readings from Last 1 Encounters:   06/03/19 194 lb 0.1 oz (88 kg)     Mental Status:  alert and able to concentrate and follow conversation    IV Access:  - None    Nursing Mobility/ADLs:  Walking   Dependent  Transfer  Dependent  Bathing  Dependent  Dressing  Dependent  Toileting  Dependent  Feeding  Dependent  Med Admin  Dependent  Med Delivery   whole    Wound Care Documentation and Therapy:        Elimination:  Continence:   · Bowel: No  · Bladder: No  Urinary Catheter: None   Colostomy/Ileostomy/Ileal Conduit: No       Date of Last BM: 6-3-2019    Intake/Output Summary (Last 24 hours) at 6/3/2019 1418  Last data filed at 6/3/2019 1254  Gross per 24 hour   Intake 720 ml   Output 150 ml   Net 570 ml     I/O last 3 completed shifts: In: 0   Out: 150 [Urine:150]    Safety Concerns: At Risk for Falls    Impairments/Disabilities:      Speech    Nutrition Therapy:  Current Nutrition Therapy:   - Oral Diet:  General    Routes of Feeding: Oral  Liquids: Thin Liquids  Daily Fluid Restriction: no  Last Modified Barium Swallow with Video (Video Swallowing Test): not done    Treatments at the Time of Hospital Discharge:   Respiratory Treatments: as needed  Oxygen Therapy:  is on oxygen at 2 L/min per nasal cannula.   Ventilator:    - No ventilator support    Rehab Therapies: nursing  Weight Bearing Status/Restrictions: No weight bearing restirctions  Other Medical Equipment (for information only, NOT a DME order):  hospital bed  Other Treatments:       Patient's personal belongings (please select all that are sent with patient):  Dentures upper and lower    RN SIGNATURE:  Electronically signed by Jose Meraz RN on 6/3/19 at 3:53 PM    CASE MANAGEMENT/SOCIAL WORK SECTION    Inpatient Status Date: 6/1/19    Readmission Risk Assessment Score:  Readmission Risk              Risk of Unplanned Readmission:        11           Discharging to Facility/ Agency   Name: [facility]  Address: [address] [place] [address]  Phone: [phone]  Fax: [phone]          / signature: Electronically signed by Pari Lambert RN on 6/3/19 at 3:16 PM    PHYSICIAN SECTION    Prognosis: Fair    Condition at Discharge: Stable    Rehab Potential (if transferring to Rehab): Fair    Recommended Labs or Other Treatments After Discharge: CBCD in one week. Resume coumadin in two weeks    Physician Certification: I certify the above information and transfer of Naheed Ramos  is necessary for the continuing treatment of the diagnosis listed and that she requires [facility] for greater 30 days.      Update Admission H&P: No change in H&P    PHYSICIAN SIGNATURE:  Electronically signed by Suzie Hogan MD on 6/3/19 at 2:26 PM

## 2019-06-03 NOTE — CARE COORDINATION
INTERDISCIPLINARY PLAN OF CARE CONFERENCE    Date/Time: 6/3/2019 2:14 PM  Completed by: Raul Carcamo Management      Patient Name:  Clara Tai  YOB: 1940  Admitting Diagnosis: GI bleed [K92.2]     Admit Date/Time:  2019  3:07 AM    Chart reviewed. Interdisciplinary team met to discuss patient progress and discharge plans. Disciplines included Case Management, Nursing, and Dietitian. Current Status:ongoing    PT/OT recommendation:n/a    Anticipated Discharge Date: tbd  Expected D/C Disposition:  Nursing Home  Confirmed plan with patient and/or family Yes with pt  Discharge Plan Comments: Reviewed chart. Role of discharge planner explained and patient verbalized understanding. Pt is from [facility] and plans to return. CM spoke with Jo Ann Lezama with [facility] and she confirms that pt may return. +bed. No HENS is needed. +eCOC. Home O2 in place on admit: No  Pt informed of need to bring portable home O2 tank on day of discharge for nursing to connect prior to leaving:  Not Indicated  Verbalized agreement/Understanding:  Not Indicated      DISCHARGE ORDER  Date/Time 6/3/2019 3:09 PM  Completed by: Sydnie Villarreal Case Management    Patient Name: Clara Tai    : 1940  Admitting Diagnosis: GI bleed [K92.2]  Admit Date/Time: 2019  3:07 AM    Noted discharge order. Confirmed discharge plan with patient / family (pt): Yes   Discharge Plan: Reviewed chart. Role of discharge planner explained and patient verbalized understanding, and CM left a HIPAA secure voicemail for Elfida Harness (pt's son). Discharge order is noted. Pt is being d/c'd back to [facility] today via [place] [address] ambulance at [phone]. Pt did not have a preference regarding ambulance companies that were used for transportation. Jo Ann Lezama with [facility] aware and verbalized understanding and acceptance for pt to come back today. Pt's O2 sats are 98% on 2L.   Faxed [facility] to [phone], per [facility] request. No further discharge needs needed or noted. Discharge orders and Continuity of Care faxed to facility: Yes  Hospital Exemption Notification System complete: no, as pt is returning  Transportation arranged: Yes - Aundria Meckel @ [phone]. Patient / Family (pt and left message with Tricia Genao) aware of  time: Yes   Nursing aware of  time: Yes, Naomy Blackburn, RN  Receiving facility aware of  time: Yes, [facility] obtained?   No

## 2019-06-03 NOTE — PLAN OF CARE
Problem: Falls - Risk of:  Goal: Will remain free from falls  Description  Will remain free from falls  Note:   Free of falls this shift     Problem: OXYGENATION/RESPIRATORY FUNCTION  Goal: Patient will achieve/maintain normal respiratory rate/effort  Description  Respiratory rate and effort will be within normal limits for the patient  Note:   VSS     Problem: Skin Integrity:  Goal: Risk for impaired skin integrity will decrease  Description  Risk for impaired skin integrity will decrease  Note:   No new skin breakdown noted

## 2019-06-03 NOTE — PROGRESS NOTES
Pt is lying in bed with their eyes closed. Respirations are easy and even. Call light within reach bed in lowest position with the wheels locked. Will continue to monitor.  David Yen

## 2019-06-03 NOTE — PROGRESS NOTES
Bedside report and Pt care transferred to [facility]. Pt denies any assistance at this time.

## 2019-06-04 LAB
ORGANISM: ABNORMAL
URINE CULTURE, ROUTINE: ABNORMAL
URINE CULTURE, ROUTINE: ABNORMAL

## 2020-11-03 PROBLEM — E03.9 ACQUIRED HYPOTHYROIDISM: Status: RESOLVED | Noted: 2019-05-29 | Resolved: 2020-11-03

## 2025-02-14 NOTE — PROGRESS NOTES
[facility]- home medications reconciled. Patient is a DNR-CCA.  Will notify MD. no

## (undated) DEVICE — ENDO CARRY-ON PROCEDURE KIT INCLUDES SUCTION TUBING, LUBRICANT, GAUZE, BIOHAZARD STICKER, TRANSPORT PAD AND INTERCEPT BEDSIDE KIT.: Brand: ENDO CARRY-ON PROCEDURE KIT

## (undated) DEVICE — FORCEPS ENDOSCP BX L230CM DIA28MM ALGTR CUP SPEC RETRV GI

## (undated) DEVICE — CONMED SCOPE SAVER BITE BLOCK, 20X27 MM: Brand: SCOPE SAVER